# Patient Record
Sex: FEMALE | Race: BLACK OR AFRICAN AMERICAN | Employment: OTHER | URBAN - METROPOLITAN AREA
[De-identification: names, ages, dates, MRNs, and addresses within clinical notes are randomized per-mention and may not be internally consistent; named-entity substitution may affect disease eponyms.]

---

## 2019-01-09 DIAGNOSIS — M25.561 RIGHT KNEE PAIN, UNSPECIFIED CHRONICITY: Primary | ICD-10-CM

## 2019-01-09 DIAGNOSIS — M25.552 HIP PAIN, LEFT: ICD-10-CM

## 2019-01-10 ENCOUNTER — OFFICE VISIT (OUTPATIENT)
Dept: ORTHOPEDIC SURGERY | Age: 79
End: 2019-01-10
Payer: MEDICARE

## 2019-01-10 VITALS
WEIGHT: 154 LBS | BODY MASS INDEX: 27.29 KG/M2 | DIASTOLIC BLOOD PRESSURE: 84 MMHG | SYSTOLIC BLOOD PRESSURE: 134 MMHG | HEIGHT: 63 IN | HEART RATE: 65 BPM

## 2019-01-10 DIAGNOSIS — M17.11 PRIMARY OSTEOARTHRITIS OF RIGHT KNEE: Primary | ICD-10-CM

## 2019-01-10 DIAGNOSIS — M70.62 GREATER TROCHANTERIC BURSITIS OF LEFT HIP: ICD-10-CM

## 2019-01-10 PROCEDURE — 1101F PT FALLS ASSESS-DOCD LE1/YR: CPT | Performed by: ORTHOPAEDIC SURGERY

## 2019-01-10 PROCEDURE — G8419 CALC BMI OUT NRM PARAM NOF/U: HCPCS | Performed by: ORTHOPAEDIC SURGERY

## 2019-01-10 PROCEDURE — 1090F PRES/ABSN URINE INCON ASSESS: CPT | Performed by: ORTHOPAEDIC SURGERY

## 2019-01-10 PROCEDURE — 1123F ACP DISCUSS/DSCN MKR DOCD: CPT | Performed by: ORTHOPAEDIC SURGERY

## 2019-01-10 PROCEDURE — 1036F TOBACCO NON-USER: CPT | Performed by: ORTHOPAEDIC SURGERY

## 2019-01-10 PROCEDURE — G8427 DOCREV CUR MEDS BY ELIG CLIN: HCPCS | Performed by: ORTHOPAEDIC SURGERY

## 2019-01-10 PROCEDURE — G8484 FLU IMMUNIZE NO ADMIN: HCPCS | Performed by: ORTHOPAEDIC SURGERY

## 2019-01-10 PROCEDURE — 20611 DRAIN/INJ JOINT/BURSA W/US: CPT | Performed by: ORTHOPAEDIC SURGERY

## 2019-01-10 PROCEDURE — G8400 PT W/DXA NO RESULTS DOC: HCPCS | Performed by: ORTHOPAEDIC SURGERY

## 2019-01-10 PROCEDURE — 4040F PNEUMOC VAC/ADMIN/RCVD: CPT | Performed by: ORTHOPAEDIC SURGERY

## 2019-01-10 PROCEDURE — 99214 OFFICE O/P EST MOD 30 MIN: CPT | Performed by: ORTHOPAEDIC SURGERY

## 2019-01-10 RX ORDER — CYCLOBENZAPRINE HCL 10 MG
10 TABLET ORAL
COMMUNITY
Start: 2018-12-28

## 2019-01-10 RX ORDER — GABAPENTIN 300 MG/1
300 CAPSULE ORAL
COMMUNITY
Start: 2019-01-09

## 2019-01-10 RX ORDER — OMEPRAZOLE 20 MG/1
20 CAPSULE, DELAYED RELEASE ORAL DAILY
COMMUNITY

## 2019-01-10 RX ORDER — HYDROCODONE BITARTRATE AND ACETAMINOPHEN 5; 325 MG/1; MG/1
5-325 TABLET ORAL
COMMUNITY
Start: 2018-12-26 | End: 2022-07-12 | Stop reason: SDUPTHER

## 2019-01-12 RX ORDER — METHYLPREDNISOLONE ACETATE 40 MG/ML
40 INJECTION, SUSPENSION INTRA-ARTICULAR; INTRALESIONAL; INTRAMUSCULAR; SOFT TISSUE ONCE
Status: DISCONTINUED | OUTPATIENT
Start: 2019-01-12 | End: 2022-04-20

## 2019-01-12 RX ORDER — LIDOCAINE HYDROCHLORIDE 10 MG/ML
4 INJECTION, SOLUTION INFILTRATION; PERINEURAL ONCE
Status: SHIPPED | OUTPATIENT
Start: 2019-01-12

## 2019-01-12 ASSESSMENT — ENCOUNTER SYMPTOMS
CONSTIPATION: 0
ABDOMINAL DISTENTION: 0
COUGH: 0
APNEA: 0
RESPIRATORY NEGATIVE: 1
NAUSEA: 0
CHEST TIGHTNESS: 0
SHORTNESS OF BREATH: 0
ABDOMINAL PAIN: 0
COLOR CHANGE: 0
VOMITING: 0
DIARRHEA: 0

## 2019-01-18 ENCOUNTER — TELEPHONE (OUTPATIENT)
Dept: ORTHOPEDIC SURGERY | Age: 79
End: 2019-01-18

## 2019-07-01 ENCOUNTER — OFFICE VISIT (OUTPATIENT)
Dept: ORTHOPEDIC SURGERY | Age: 79
End: 2019-07-01
Payer: MEDICARE

## 2019-07-01 VITALS
HEART RATE: 65 BPM | DIASTOLIC BLOOD PRESSURE: 73 MMHG | SYSTOLIC BLOOD PRESSURE: 114 MMHG | HEIGHT: 64 IN | WEIGHT: 156 LBS | BODY MASS INDEX: 26.63 KG/M2

## 2019-07-01 DIAGNOSIS — S70.02XA CONTUSION OF LEFT HIP, INITIAL ENCOUNTER: Primary | ICD-10-CM
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
## 2019-07-01 PROCEDURE — 99213 OFFICE O/P EST LOW 20 MIN: CPT | Performed by: PHYSICIAN ASSISTANT

## 2019-07-01 RX ORDER — AMMONIUM LACTATE 12 G/100G
12 CREAM TOPICAL DAILY
COMMUNITY
Start: 2018-05-15

## 2019-07-01 RX ORDER — CYCLOSPORINE 0.5 MG/ML
1 EMULSION OPHTHALMIC DAILY
COMMUNITY
Start: 2018-02-02

## 2019-07-01 RX ORDER — ATORVASTATIN CALCIUM 10 MG/1
10 TABLET, FILM COATED ORAL DAILY
COMMUNITY
Start: 2017-09-22

## 2019-07-01 ASSESSMENT — ENCOUNTER SYMPTOMS
SHORTNESS OF BREATH: 0
ABDOMINAL DISTENTION: 0
COUGH: 0
APNEA: 0
VOMITING: 0
ABDOMINAL PAIN: 0
CONSTIPATION: 0
NAUSEA: 0
CHEST TIGHTNESS: 0
COLOR CHANGE: 0
DIARRHEA: 0

## 2019-07-01 NOTE — PROGRESS NOTES
have reviewed their documentation prior to providing my signature indicating agreement. Vitals:   /73   Pulse 65   Ht 5' 3.75\" (1.619 m)   Wt 156 lb (70.8 kg)   BMI 26.99 kg/m²  Body mass index is 26.99 kg/m². Physical Examination:     Orthopedics:    GENERAL: Alert and oriented X3 in no acute distress. SKIN: Intact without lesions or ulcerations. NEURO: Musculoskeletal and axillary nerves intact to sensory and motor testing. VASC: Capillary refill is less than 3 seconds. Post Op Exam:    LOCATION: Left Hip  SITE: Distal neurocirculatory status is intact. EXAM: Sensation is intact to light touch, there is full motor function of the extremity. PALP: Incision is well healed with no signs of infection. Ecchymosis noted over the lateral aspect of the hip  ROM: 90 degrees of flexion    Procedures:     Procedure:  No    Radiology:   See separate report. Assessment:     1. Contusion of left hip, initial encounter      Plan:   Post Op Treatment : Patient had the treatment regimen reviewed today 3 years and 11 months s/p Left GOPAL. I reviewed the films with the patient. We discussed some of the etiologies and natural histories of s/p left GOPAL. We also discussed the various treatment alternatives including anti-inflammatory medications, physical therapy, injections, further imaging studies and as a last resort surgery. During today's visit, I explained to the patient that she needs to be careful when she is moving her furniture at home because her strength is not all there and she needs to regain her strength in her legs so she may improve her balance. I also told her that she should call us if the pain does not go away over the next two weeks but she should take the NSAID's at a prescription dose to help reduce the inflammation and pain she has over her hip. Patient should return to the office as needed. The patient will call the office immediately with any problems that may arise.      No orders of the defined types were placed in this encounter. No orders of the defined types were placed in this encounter. Sil Christensen V, am scribing for and in the presence of  Buck Lira ATC. 7/6/2019  8:59 PM      I, Ebony Lombardo PA-C, BELKIS,  have personally seen this patient, reviewed the chart including history, and imaging if done. I personally  performed the physical exam and obtained any needed additional history. I placed orders, performed or supervised procedures and developed the treatment plan.     Electronically signed by Danni Culp PA-C, on 7/6/2019 at 8:59 PM      Electronically signed by Danni Culp PA-C, on 7/6/2019 at 8:59 PM

## 2019-10-02 ENCOUNTER — OFFICE VISIT (OUTPATIENT)
Dept: ORTHOPEDIC SURGERY | Age: 79
End: 2019-10-02
Payer: MEDICARE

## 2019-10-02 VITALS
WEIGHT: 155 LBS | BODY MASS INDEX: 26.46 KG/M2 | SYSTOLIC BLOOD PRESSURE: 133 MMHG | HEIGHT: 64 IN | DIASTOLIC BLOOD PRESSURE: 66 MMHG | HEART RATE: 65 BPM

## 2019-10-02 DIAGNOSIS — M51.36 DDD (DEGENERATIVE DISC DISEASE), LUMBAR: ICD-10-CM

## 2019-10-02 DIAGNOSIS — M54.32 SCIATICA OF LEFT SIDE: Primary | ICD-10-CM

## 2019-10-02 PROCEDURE — G8484 FLU IMMUNIZE NO ADMIN: HCPCS | Performed by: PHYSICIAN ASSISTANT

## 2019-10-02 PROCEDURE — G8419 CALC BMI OUT NRM PARAM NOF/U: HCPCS | Performed by: PHYSICIAN ASSISTANT

## 2019-10-02 PROCEDURE — 4040F PNEUMOC VAC/ADMIN/RCVD: CPT | Performed by: PHYSICIAN ASSISTANT

## 2019-10-02 PROCEDURE — 1036F TOBACCO NON-USER: CPT | Performed by: PHYSICIAN ASSISTANT

## 2019-10-02 PROCEDURE — G8400 PT W/DXA NO RESULTS DOC: HCPCS | Performed by: PHYSICIAN ASSISTANT

## 2019-10-02 PROCEDURE — 99213 OFFICE O/P EST LOW 20 MIN: CPT | Performed by: PHYSICIAN ASSISTANT

## 2019-10-02 PROCEDURE — 1090F PRES/ABSN URINE INCON ASSESS: CPT | Performed by: PHYSICIAN ASSISTANT

## 2019-10-02 PROCEDURE — 1123F ACP DISCUSS/DSCN MKR DOCD: CPT | Performed by: PHYSICIAN ASSISTANT

## 2019-10-02 PROCEDURE — G8427 DOCREV CUR MEDS BY ELIG CLIN: HCPCS | Performed by: PHYSICIAN ASSISTANT

## 2019-10-02 RX ORDER — METHYLPREDNISOLONE 4 MG/1
TABLET ORAL
Qty: 1 KIT | Refills: 0 | Status: SHIPPED | OUTPATIENT
Start: 2019-10-02 | End: 2022-04-20

## 2019-10-02 RX ORDER — TIZANIDINE 2 MG/1
2 TABLET ORAL EVERY 8 HOURS PRN
Qty: 30 TABLET | Refills: 0 | Status: SHIPPED | OUTPATIENT
Start: 2019-10-02 | End: 2019-10-12

## 2019-10-02 ASSESSMENT — ENCOUNTER SYMPTOMS
ABDOMINAL PAIN: 0
DIARRHEA: 0
ABDOMINAL DISTENTION: 0
SHORTNESS OF BREATH: 0
COUGH: 0
APNEA: 0
CHEST TIGHTNESS: 0
CONSTIPATION: 0
VOMITING: 0
NAUSEA: 0
COLOR CHANGE: 0

## 2019-11-25 ENCOUNTER — OFFICE VISIT (OUTPATIENT)
Dept: ORTHOPEDIC SURGERY | Age: 79
End: 2019-11-25
Payer: MEDICARE

## 2019-11-25 VITALS
BODY MASS INDEX: 26.58 KG/M2 | SYSTOLIC BLOOD PRESSURE: 138 MMHG | HEART RATE: 63 BPM | HEIGHT: 63 IN | DIASTOLIC BLOOD PRESSURE: 73 MMHG | WEIGHT: 150 LBS

## 2019-11-25 DIAGNOSIS — M51.36 DDD (DEGENERATIVE DISC DISEASE), LUMBAR: Primary | ICD-10-CM

## 2019-11-25 DIAGNOSIS — R52 PAIN: ICD-10-CM

## 2019-11-25 DIAGNOSIS — M17.12 PRIMARY OSTEOARTHRITIS OF LEFT KNEE: ICD-10-CM

## 2019-11-25 PROCEDURE — 1036F TOBACCO NON-USER: CPT | Performed by: ORTHOPAEDIC SURGERY

## 2019-11-25 PROCEDURE — G8400 PT W/DXA NO RESULTS DOC: HCPCS | Performed by: ORTHOPAEDIC SURGERY

## 2019-11-25 PROCEDURE — 1123F ACP DISCUSS/DSCN MKR DOCD: CPT | Performed by: ORTHOPAEDIC SURGERY

## 2019-11-25 PROCEDURE — G8427 DOCREV CUR MEDS BY ELIG CLIN: HCPCS | Performed by: ORTHOPAEDIC SURGERY

## 2019-11-25 PROCEDURE — 1090F PRES/ABSN URINE INCON ASSESS: CPT | Performed by: ORTHOPAEDIC SURGERY

## 2019-11-25 PROCEDURE — G8417 CALC BMI ABV UP PARAM F/U: HCPCS | Performed by: ORTHOPAEDIC SURGERY

## 2019-11-25 PROCEDURE — 20611 DRAIN/INJ JOINT/BURSA W/US: CPT | Performed by: ORTHOPAEDIC SURGERY

## 2019-11-25 PROCEDURE — 99214 OFFICE O/P EST MOD 30 MIN: CPT | Performed by: ORTHOPAEDIC SURGERY

## 2019-11-25 PROCEDURE — G8484 FLU IMMUNIZE NO ADMIN: HCPCS | Performed by: ORTHOPAEDIC SURGERY

## 2019-11-25 PROCEDURE — 4040F PNEUMOC VAC/ADMIN/RCVD: CPT | Performed by: ORTHOPAEDIC SURGERY

## 2019-11-25 RX ORDER — LIDOCAINE HYDROCHLORIDE 10 MG/ML
4 INJECTION, SOLUTION INFILTRATION; PERINEURAL ONCE
Status: COMPLETED | OUTPATIENT
Start: 2019-11-25 | End: 2019-11-25

## 2019-11-25 RX ORDER — METHYLPREDNISOLONE ACETATE 40 MG/ML
40 INJECTION, SUSPENSION INTRA-ARTICULAR; INTRALESIONAL; INTRAMUSCULAR; SOFT TISSUE ONCE
Status: COMPLETED | OUTPATIENT
Start: 2019-11-25 | End: 2019-11-25

## 2019-11-25 RX ADMIN — LIDOCAINE HYDROCHLORIDE 4 ML: 10 INJECTION, SOLUTION INFILTRATION; PERINEURAL at 17:25

## 2019-11-25 RX ADMIN — METHYLPREDNISOLONE ACETATE 40 MG: 40 INJECTION, SUSPENSION INTRA-ARTICULAR; INTRALESIONAL; INTRAMUSCULAR; SOFT TISSUE at 17:26

## 2019-11-25 ASSESSMENT — ENCOUNTER SYMPTOMS
SHORTNESS OF BREATH: 0
APNEA: 0
ABDOMINAL PAIN: 0
COLOR CHANGE: 0
CHEST TIGHTNESS: 0
CONSTIPATION: 0
ABDOMINAL DISTENTION: 0
VOMITING: 0
COUGH: 0
NAUSEA: 0
DIARRHEA: 0

## 2020-10-14 ENCOUNTER — OFFICE VISIT (OUTPATIENT)
Dept: ORTHOPEDIC SURGERY | Age: 80
End: 2020-10-14
Payer: MEDICARE

## 2020-10-14 VITALS
HEIGHT: 63 IN | SYSTOLIC BLOOD PRESSURE: 140 MMHG | TEMPERATURE: 97 F | WEIGHT: 150 LBS | DIASTOLIC BLOOD PRESSURE: 77 MMHG | BODY MASS INDEX: 26.58 KG/M2 | HEART RATE: 55 BPM

## 2020-10-14 PROCEDURE — 4040F PNEUMOC VAC/ADMIN/RCVD: CPT | Performed by: PHYSICIAN ASSISTANT

## 2020-10-14 PROCEDURE — 1036F TOBACCO NON-USER: CPT | Performed by: PHYSICIAN ASSISTANT

## 2020-10-14 PROCEDURE — G8417 CALC BMI ABV UP PARAM F/U: HCPCS | Performed by: PHYSICIAN ASSISTANT

## 2020-10-14 PROCEDURE — G8400 PT W/DXA NO RESULTS DOC: HCPCS | Performed by: PHYSICIAN ASSISTANT

## 2020-10-14 PROCEDURE — 1090F PRES/ABSN URINE INCON ASSESS: CPT | Performed by: PHYSICIAN ASSISTANT

## 2020-10-14 PROCEDURE — 1123F ACP DISCUSS/DSCN MKR DOCD: CPT | Performed by: PHYSICIAN ASSISTANT

## 2020-10-14 PROCEDURE — G8484 FLU IMMUNIZE NO ADMIN: HCPCS | Performed by: PHYSICIAN ASSISTANT

## 2020-10-14 PROCEDURE — G8427 DOCREV CUR MEDS BY ELIG CLIN: HCPCS | Performed by: PHYSICIAN ASSISTANT

## 2020-10-14 PROCEDURE — 99213 OFFICE O/P EST LOW 20 MIN: CPT | Performed by: PHYSICIAN ASSISTANT

## 2020-10-14 ASSESSMENT — ENCOUNTER SYMPTOMS
COLOR CHANGE: 0
SHORTNESS OF BREATH: 0
ABDOMINAL DISTENTION: 0
CHEST TIGHTNESS: 0
COUGH: 0
ABDOMINAL PAIN: 0
CONSTIPATION: 0
APNEA: 0
VOMITING: 0
NAUSEA: 0
DIARRHEA: 0

## 2020-10-14 NOTE — PROGRESS NOTES
[facility]  [address] [address]  Dept: [phone]  Dept Fax: [phone]        {RIGHT OR LEFT:07049} Hip Office Visit    Subjective:   No chief complaint on file. HPI:     Siomara Georges presents with a *** history of pain in the {Left/right:47177} hip. The pain {DOES/DOES NOT:27143} radiate into the thigh and into the groin. The pain is {Blank multiple:17965::\"not present\",\"present\"} over the lateral aspect of the hip. The pain is most troublesome during ambulatory activity and now limits the patient`s walking distance to ***. Night pain, which disturbs the patient`s sleep {IS/IS NOT:14041} a problem. The patient has had to give up some activities such as ***, which has produced a consequent deterioration in quality of life. she has tried *** and reduction of activity and reports {Desc; no/some/marked:59733} improvement. Back pain is {Blank multiple:97244::\"not present\",\"present\"} but is tolerable and {DOES/DOES NOT:98770} not interfere with the patient`s life as does the hip. The patient {HAS HAS CCC:32633} had a cortisone injection. The patient {HAS HAS PL} tried physical therapy. The patient {HAS HAS JGV:51254} had surgery. The opposite hip {IS/IS AOZ:62973} okay. Knee pain is {MINIMALLY/MODERATELY/MARKEDLY HIP FRACTURE HPI MD:30203} noted. ROS:     Review of Systems    Past Medical History:    Past Medical History:   Diagnosis Date    Acid reflux        Past Surgical History:    Past Surgical History:   Procedure Laterality Date    CATARACT REMOVAL      GALEN    HIP SURGERY      galen GOPAL       CurrentMedications:   Current Outpatient Medications   Medication Sig Dispense Refill    methylPREDNISolone (MEDROL DOSEPACK) 4 MG tablet Take by mouth.  1 kit 0    lipase-protease-amylase (CREON) 66228 units CPEP delayed release capsule Take 180,000 Units by mouth daily      atorvastatin (LIPITOR) 10 MG tablet Take 10 mg by mouth daily      cycloSPORINE (RESTASIS) 0.05 % ophthalmic emulsion Apply 1 drop to eye daily      ammonium lactate (AMLACTIN) 12 % cream Apply 12 each topically daily      Handicap Placard MISC 5 year handicap placard 1 each 0    cyclobenzaprine (FLEXERIL) 10 MG tablet 10 mg      gabapentin (NEURONTIN) 300 MG capsule 300 mg..      HYDROcodone-acetaminophen (NORCO) 5-325 MG per tablet 5-325 tablets. Woody Hicks omeprazole (PRILOSEC) 20 MG delayed release capsule Take 20 mg by mouth daily       Current Facility-Administered Medications   Medication Dose Route Frequency Provider Last Rate Last Dose    lidocaine 1 % injection 4 mL  4 mL Intra-articular Once Susen Pattee, DO        methylPREDNISolone acetate (DEPO-MEDROL) injection 40 mg  40 mg Intra-articular Once Susen Pattee, DO        lidocaine 1 % injection 4 mL  4 mL Intra-articular Once Susen Pattee, DO        methylPREDNISolone acetate (DEPO-MEDROL) injection 40 mg  40 mg Intra-articular Once Susen Pattee, DO           Allergies:    Patient has no known allergies.     Social History:   Social History     Socioeconomic History    Marital status:      Spouse name: Not on file    Number of children: Not on file    Years of education: Not on file    Highest education level: Not on file   Occupational History    Not on file   Social Needs    Financial resource strain: Not on file    Food insecurity     Worry: Not on file     Inability: Not on file   [employer] needs     Medical: Not on file     Non-medical: Not on file   Tobacco Use    Smoking status: Never Smoker    Smokeless tobacco: Never Used   Substance and Sexual Activity    Alcohol use: No    Drug use: No    Sexual activity: Not on file   Lifestyle    Physical activity     Days per week: Not on file     Minutes per session: Not on file    Stress: Not on file   Relationships    Social connections     Talks on phone: Not on file     Gets together: Not on file Attends Yarsanism service: Not on file     Active member of club or organization: Not on file     Attends meetings of clubs or organizations: Not on file     Relationship status: Not on file    Intimate partner violence     Fear of current or ex partner: Not on file     Emotionally abused: Not on file     Physically abused: Not on file     Forced sexual activity: Not on file   Other Topics Concern    Not on file   Social History Narrative    Not on file       Family History:  No family history on file. Vitals: There were no vitals taken for this visit. There is no height or weight on file to calculate BMI. Physical Examination:     Orthopedics:    GENERAL: Alert and oriented X3 in no acute distress. SKIN: Intact without lesions or ulcerations. NEURO: Intact to sensory and motor testing. VASC: Capillary refill is less than 3 seconds. {RIGHT OR LEFT:20818} Hip     GEN: Alert and oriented X 3, in no acute distress. GAIT: The patient's gait was observed while entering the exam room and was noted to be *** antalgic. The extremity is in {Blank single:95525::\"anatomic\",\"varus\",\"valgus\"} alignment. SKIN: Intact without rashes, lesions, or ulcerations. No obvious deformity or swelling. NEURO: The patient responds to light touch throughout {RIGHT LEFT:96525} LE. Patellar and Achilles reflexes are 2/4. VASC: The {RIGHT LEFT:40271} LE is neurovascularly intact with 2/4 DP and ***/4 PT pulses. Brisk capillary refill. ROM: ***degree flexion contracture, *** degrees flexion, *** degrees abduction, *** degrees adduction, *** degrees of internal rotation, *** degrees of external rotation. MUSC: Strength is {Numbers; 1-5:22390}/5 flexion, abduction, internal rotation, external rotation. PALP: The patient {IS/IS NOT:19932} tender to palpation over the greater trochanter. TEST: Log roll is ***. ***No apparent leg length discrepancy.  *** Stenchfield test. *** Impingement test. ***Negative Trendelenburg test.

## 2020-10-14 NOTE — PROGRESS NOTES
[facility]  [address] [place] [id number]  Dept: [phone]  Dept Fax: [phone]        Post Operative Follow Up    Subjective:     Chief Complaint   Patient presents with    Hip Pain     bilateral hip pain     Post Op Surgery:     The patient is here for a follow up after having a left GOPAL and right GOPAL. The date of surgery was on 08/05/2015 for the left hip and in 2011 for the right hip. Therefore we are 5 years and 10 weeks post op on the left hip and 9 years post op for the right hip. Currently the patient's pain is controlled with nothing. Physical therapy was completed. Patient presents today with no ambulatory devices. Patient states that she is doing very well and she is here today for her yearly follow up. Review of Systems   Constitutional: Positive for activity change. Negative for appetite change, fatigue and fever. Respiratory: Negative for apnea, cough, chest tightness and shortness of breath. Cardiovascular: Negative for chest pain, palpitations and leg swelling. Gastrointestinal: Negative for abdominal distention, abdominal pain, constipation, diarrhea, nausea and vomiting. Genitourinary: Negative for difficulty urinating, dysuria and hematuria. Musculoskeletal: Positive for arthralgias. Negative for gait problem, joint swelling and myalgias. Skin: Negative for color change and rash. Neurological: Negative for dizziness, weakness, numbness and headaches. Psychiatric/Behavioral: Negative for sleep disturbance. I have reviewed the CC, HPI, ROS, PMH, FHX, Social History, and if not present in this note, I have reviewed in the patient's chart. I agree with the documentation provided by other staff and have reviewed their documentation prior to providing my signature indicating agreement.   Vitals:   BP (!) 140/77   Pulse 55   Temp 97 °F (36.1 °C)   Ht 5' 3\" (1.6 m)   Wt 150 lb (68 kg) GURPREET. 10/16/2020  12:52 PM      I, Ritesh Cheek PA-C, have personally seen this patient, reviewed the chart including history, and imaging if done. I personally  performed the physical exam and obtained any needed additional history. I placed orders, performed or supervised procedures and developed the treatment plan.     Electronically signed by Bryan Alvarez PA-C, on 10/16/2020 at 12:52 PM      Electronically signed by Bryan Alvarez PA-C, on 10/16/2020 at 12:52 PM

## 2021-11-16 DIAGNOSIS — M16.11 PRIMARY OSTEOARTHRITIS OF RIGHT HIP: ICD-10-CM

## 2021-11-16 DIAGNOSIS — Z96.643 STATUS POST TOTAL REPLACEMENT OF BOTH HIPS: ICD-10-CM

## 2021-11-16 DIAGNOSIS — M16.12 PRIMARY OSTEOARTHRITIS OF LEFT HIP: Primary | ICD-10-CM

## 2021-11-17 ENCOUNTER — OFFICE VISIT (OUTPATIENT)
Dept: ORTHOPEDIC SURGERY | Age: 81
End: 2021-11-17
Payer: MEDICARE

## 2021-11-17 VITALS
DIASTOLIC BLOOD PRESSURE: 67 MMHG | BODY MASS INDEX: 26.93 KG/M2 | HEIGHT: 63 IN | SYSTOLIC BLOOD PRESSURE: 115 MMHG | WEIGHT: 152 LBS | RESPIRATION RATE: 14 BRPM | TEMPERATURE: 98.2 F | HEART RATE: 69 BPM

## 2021-11-17 DIAGNOSIS — Z96.643 HISTORY OF TOTAL HIP REPLACEMENT, BILATERAL: Primary | ICD-10-CM

## 2021-11-17 PROCEDURE — 1123F ACP DISCUSS/DSCN MKR DOCD: CPT | Performed by: PHYSICIAN ASSISTANT

## 2021-11-17 PROCEDURE — G8484 FLU IMMUNIZE NO ADMIN: HCPCS | Performed by: PHYSICIAN ASSISTANT

## 2021-11-17 PROCEDURE — 1090F PRES/ABSN URINE INCON ASSESS: CPT | Performed by: PHYSICIAN ASSISTANT

## 2021-11-17 PROCEDURE — G8400 PT W/DXA NO RESULTS DOC: HCPCS | Performed by: PHYSICIAN ASSISTANT

## 2021-11-17 PROCEDURE — G8427 DOCREV CUR MEDS BY ELIG CLIN: HCPCS | Performed by: PHYSICIAN ASSISTANT

## 2021-11-17 PROCEDURE — 1036F TOBACCO NON-USER: CPT | Performed by: PHYSICIAN ASSISTANT

## 2021-11-17 PROCEDURE — 4040F PNEUMOC VAC/ADMIN/RCVD: CPT | Performed by: PHYSICIAN ASSISTANT

## 2021-11-17 PROCEDURE — G8417 CALC BMI ABV UP PARAM F/U: HCPCS | Performed by: PHYSICIAN ASSISTANT

## 2021-11-17 PROCEDURE — 99213 OFFICE O/P EST LOW 20 MIN: CPT | Performed by: PHYSICIAN ASSISTANT

## 2021-11-17 ASSESSMENT — ENCOUNTER SYMPTOMS
RESPIRATORY NEGATIVE: 1
CHEST TIGHTNESS: 0
CONSTIPATION: 0
ABDOMINAL DISTENTION: 0
SHORTNESS OF BREATH: 0
COUGH: 0
DIARRHEA: 0
NAUSEA: 0
APNEA: 0
COLOR CHANGE: 0
VOMITING: 0
ABDOMINAL PAIN: 0

## 2021-11-17 NOTE — PROGRESS NOTES
[facility] [facility]  [address] [place] [id number]  Dept: [phone]  Dept Fax: [phone]        Bilateral Hip Office Visit    Subjective:     Chief Complaint   Patient presents with    Follow-up     B Hips s/p GOPAL in 2011 and 2015      HPI:     Blaire De La Torre presents with a history of bilateral total hip replacements. The right was replaced in 2011 and the left was replaced in 2015 by Dr. Shania Gore. She is extremely happy with her hip replacements and has no complaints. She is able to do everything that she wants to do. She is even bowling on a regular basis. She does not need anything for pain. She had been doing antibiotic protocol for dental procedures until her last appointment. ROS:     Review of Systems   Constitutional: Positive for activity change. Negative for appetite change, fatigue and fever. Respiratory: Negative. Negative for apnea, cough, chest tightness and shortness of breath. Cardiovascular: Negative. Negative for chest pain, palpitations and leg swelling. Gastrointestinal: Negative for abdominal distention, abdominal pain, constipation, diarrhea, nausea and vomiting. Genitourinary: Negative for difficulty urinating, dysuria and hematuria. Musculoskeletal: Negative for arthralgias, gait problem, joint swelling and myalgias. Skin: Negative for color change and rash. Neurological: Negative for dizziness, weakness, numbness and headaches. Psychiatric/Behavioral: Negative for sleep disturbance. Past Medical History:    Past Medical History:   Diagnosis Date    Acid reflux        Past Surgical History:    Past Surgical History:   Procedure Laterality Date    CATARACT REMOVAL      GALEN    HIP SURGERY      galen GOPAL       CurrentMedications:   Current Outpatient Medications   Medication Sig Dispense Refill    methylPREDNISolone (MEDROL DOSEPACK) 4 MG tablet Take by mouth.  1 kit 0    lipase-protease-amylase (CREON) 29008 units CPEP delayed release capsule Take 180,000 Units by mouth daily      atorvastatin (LIPITOR) 10 MG tablet Take 10 mg by mouth daily      cycloSPORINE (RESTASIS) 0.05 % ophthalmic emulsion Apply 1 drop to eye daily      ammonium lactate (AMLACTIN) 12 % cream Apply 12 each topically daily      Handicap Placard MISC 5 year handicap placard 1 each 0    cyclobenzaprine (FLEXERIL) 10 MG tablet 10 mg      gabapentin (NEURONTIN) 300 MG capsule 300 mg..      HYDROcodone-acetaminophen (NORCO) 5-325 MG per tablet 5-325 tablets. Felisa Patricia omeprazole (PRILOSEC) 20 MG delayed release capsule Take 20 mg by mouth daily       Current Facility-Administered Medications   Medication Dose Route Frequency Provider Last Rate Last Admin    lidocaine 1 % injection 4 mL  4 mL Intra-artICUlar Once Pancho Car, DO        methylPREDNISolone acetate (DEPO-MEDROL) injection 40 mg  40 mg Intra-artICUlar Once Pancho Car, DO        lidocaine 1 % injection 4 mL  4 mL Intra-artICUlar Once Pancho Car, DO        methylPREDNISolone acetate (DEPO-MEDROL) injection 40 mg  40 mg Intra-artICUlar Once Pancho Car, DO           Allergies:    Patient has no known allergies.     Social History:   Social History     Socioeconomic History    Marital status:      Spouse name: None    Number of children: None    Years of education: None    Highest education level: None   Occupational History    None   Tobacco Use    Smoking status: Never Smoker    Smokeless tobacco: Never Used   Substance and Sexual Activity    Alcohol use: No    Drug use: No    Sexual activity: None   Other Topics Concern    None   Social History Narrative    None     Social Determinants of Health     Financial Resource Strain:     Difficulty of Paying Living Expenses: Not on file   Food Insecurity:     Worried About Running Out of Food in the Last Year: Not on file    Gisele of Food in the Last Year: Not on file Transportation Needs:     Lack of Transportation (Medical): Not on file    Lack of Transportation (Non-Medical): Not on file   Physical Activity:     Days of Exercise per Week: Not on file    Minutes of Exercise per Session: Not on file   Stress:     Feeling of Stress : Not on file   Social Connections:     Frequency of Communication with Friends and Family: Not on file    Frequency of Social Gatherings with Friends and Family: Not on file    Attends Scientologist Services: Not on file    Active Member of [address] [place] [address] [employer] or Organizations: Not on file    Attends Club or Organization Meetings: Not on file    Marital Status: Not on file   Intimate Partner Violence:     Fear of Current or Ex-Partner: Not on file    Emotionally Abused: Not on file    Physically Abused: Not on file    Sexually Abused: Not on file   Housing Stability:     Unable to Pay for Housing in the Last Year: Not on file    Number of [place] in the Last Year: Not on file    Unstable Housing in the Last Year: Not on file       Family History:  No family history on file. Vitals:   /67   Pulse 69   Temp 98.2 °F (36.8 °C)   Resp 14   Ht 5' 3\" (1.6 m)   Wt 152 lb (68.9 kg)   BMI 26.93 kg/m²  Body mass index is 26.93 kg/m². Physical Examination:     Orthopedics:    GENERAL: Alert and oriented X3 in no acute distress. SKIN: Intact without lesions or ulcerations. NEURO: Intact to sensory and motor testing. VASC: Capillary refill is less than 3 seconds. Bilateral Hip     GEN: Alert and oriented X 3, in no acute distress. GAIT: The patient's gait was observed while entering the exam room and was noted to be non antalgic. The extremity is in anatomic alignment. SKIN: Intact without rashes, lesions, or ulcerations. No obvious deformity or swelling. NEURO: The patient responds to light touch throughout bilateral LE. Patellar and Achilles reflexes are 2/4.   VASC: The bilateral LE is neurovascularly intact with 2/4 DP and 2/4 PT pulses. Brisk capillary refill. ROM: 0 degree flexion contracture, 100 degrees flexion, 30 degrees abduction, 30 degrees adduction, 30 degrees of internal rotation, 45 degrees of external rotation. MUSC: Strength is 5/5 flexion, abduction, internal rotation, external rotation. PALP: The patient is  tender to palpation over the greater trochanter. TEST: Log roll is negative. No apparent leg length discrepancy. Negative Stenchfield test.  Negative impingement test. Negative Trendelenburg test. Negative Samuel's test.  Negative C sign. No labral clunks. No pain on compression. Assessment:     1. History of total hip replacement, bilateral      Procedures:    Procedure: no  Radiology:   XR HIP LEFT (2-3 VIEWS)    Result Date: 11/17/2021  HIP X-RAY  AP and standing AP of the pelvis and supine AP and frog leg lateral of the bilateral hip reveals a total hips arthroplasty in proper position with no subsidence, loosening (DeLee-Charnley zones 1-3 of the acetabulum or Gruen zones 1-7 of the femoral component) or osteolysis present. There is no acute bony abnormalities including soft tissue masses, periosteal reaction or lytic bony lesions. 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 No significant change in his previous x-rays October 14, 2020  Impression: Bilateral total hip arthroplasty with no complicating features. XR HIP RIGHT MIN 4VW W PELVIS    Result Date: 11/17/2021  HIP X-RAY  AP and standing AP of the pelvis and supine AP and frog leg lateral of the bilateral hip reveals a total hips arthroplasty in proper position with no subsidence, loosening (DeLee-Charnley zones 1-3 of the acetabulum or Gruen zones 1-7 of the femoral component) or osteolysis present. There is no acute bony abnormalities including soft tissue masses, periosteal reaction or lytic bony lesions. 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   No significant change in his previous x-rays October 14, 2020  Impression: Bilateral total hip arthroplasty with no complicating features. Plan:   Treatment : I reviewed the X-ray with the patient and I informed them that the x-rays look good with no changes since her previous x-rays in October 2020. We discussed the etiologies and natural histories of bilateral total hip arthroplasties. We discussed the various treatment alternatives including anti-inflammatory medications, physical therapy, injections, further imaging studies and as a last result surgery. During today's visit, we discussed that she is extremely happy with her hip replacements. We did discuss antibiotic dental prophylaxis and there is some controversy on whether it is needed or not. Dr. Natali Klein is him working with his now and he does say that it should be done for a lifetime. If she does not have issues with the antibiotics I would suggest that she continue using them because getting a septic joint is devastating. The patient has opted for following up in 1 or 2 years with preclinic x-rays. Patient should return to the clinic in 1 years to follow up with  Kenny Washburn PA-C. The patient will call the office immediately with any problems. No orders of the defined types were placed in this encounter. No orders of the defined types were placed in this encounter. This note is created with the assistance of a speech recognition program.  While intending to generate a document that actually reflects the content of the visit, the document can still have some errors including those of syntax and sound a like substitutions which may escape proof reading.   In such instances, actual meaning can be extrapolated by contextual diversion      Electronically signed by Seda Stone PA-C, on 11/17/2021 at 4:28 PM

## 2022-04-20 ENCOUNTER — OFFICE VISIT (OUTPATIENT)
Dept: ORTHOPEDIC SURGERY | Age: 82
End: 2022-04-20
Payer: MEDICARE

## 2022-04-20 VITALS
HEIGHT: 63 IN | SYSTOLIC BLOOD PRESSURE: 132 MMHG | DIASTOLIC BLOOD PRESSURE: 74 MMHG | WEIGHT: 149 LBS | RESPIRATION RATE: 12 BRPM | HEART RATE: 59 BPM | BODY MASS INDEX: 26.4 KG/M2

## 2022-04-20 DIAGNOSIS — M70.62 GREATER TROCHANTERIC BURSITIS OF LEFT HIP: ICD-10-CM

## 2022-04-20 DIAGNOSIS — Z96.643 HISTORY OF TOTAL HIP REPLACEMENT, BILATERAL: Primary | ICD-10-CM

## 2022-04-20 DIAGNOSIS — M51.36 DDD (DEGENERATIVE DISC DISEASE), LUMBAR: ICD-10-CM

## 2022-04-20 PROCEDURE — 4040F PNEUMOC VAC/ADMIN/RCVD: CPT | Performed by: PHYSICIAN ASSISTANT

## 2022-04-20 PROCEDURE — 99214 OFFICE O/P EST MOD 30 MIN: CPT | Performed by: PHYSICIAN ASSISTANT

## 2022-04-20 PROCEDURE — G8400 PT W/DXA NO RESULTS DOC: HCPCS | Performed by: PHYSICIAN ASSISTANT

## 2022-04-20 PROCEDURE — 1036F TOBACCO NON-USER: CPT | Performed by: PHYSICIAN ASSISTANT

## 2022-04-20 PROCEDURE — 1090F PRES/ABSN URINE INCON ASSESS: CPT | Performed by: PHYSICIAN ASSISTANT

## 2022-04-20 PROCEDURE — 1123F ACP DISCUSS/DSCN MKR DOCD: CPT | Performed by: PHYSICIAN ASSISTANT

## 2022-04-20 PROCEDURE — G8427 DOCREV CUR MEDS BY ELIG CLIN: HCPCS | Performed by: PHYSICIAN ASSISTANT

## 2022-04-20 PROCEDURE — G8417 CALC BMI ABV UP PARAM F/U: HCPCS | Performed by: PHYSICIAN ASSISTANT

## 2022-04-20 RX ORDER — TIZANIDINE 4 MG/1
4 TABLET ORAL EVERY 8 HOURS PRN
Qty: 30 TABLET | Refills: 0 | Status: SHIPPED | OUTPATIENT
Start: 2022-04-20 | End: 2022-04-30

## 2022-04-20 RX ORDER — METHYLPREDNISOLONE 4 MG/1
TABLET ORAL
Qty: 1 KIT | Refills: 0 | Status: SHIPPED | OUTPATIENT
Start: 2022-04-20 | End: 2022-08-17 | Stop reason: ALTCHOICE

## 2022-04-20 ASSESSMENT — ENCOUNTER SYMPTOMS
COLOR CHANGE: 0
CHEST TIGHTNESS: 0
APNEA: 0
COUGH: 0
ABDOMINAL PAIN: 0
ABDOMINAL DISTENTION: 0
NAUSEA: 0
DIARRHEA: 0
VOMITING: 0
CONSTIPATION: 0
RESPIRATORY NEGATIVE: 1
SHORTNESS OF BREATH: 0

## 2022-04-20 NOTE — PATIENT INSTRUCTIONS
Patient Education        Low Back Pain: Exercises  Introduction  Here are some examples of exercises for you to try. The exercises may be suggested for a condition or for rehabilitation. Start each exercise slowly. Ease off the exercises if you start to have pain. You will be told when to start these exercises and which ones will work bestfor you. How to do the exercises  Press-up    1. Lie on your stomach, supporting your body with your forearms. 2. Press your elbows down into the floor to raise your upper back. As you do this, relax your stomach muscles and allow your back to arch without using your back muscles. As your press up, do not let your hips or pelvis come off the floor. 3. Hold for 15 to 30 seconds, then relax. 4. Repeat 2 to 4 times. Alternate arm and leg (bird dog) exercise    Do this exercise slowly. Try to keep your body straight at all times, and donot let one hip drop lower than the other. 1. Start on the floor, on your hands and knees. 2. Tighten your belly muscles. 3. Raise one leg off the floor, and hold it straight out behind you. Be careful not to let your hip drop down, because that will twist your trunk. 4. Hold for about 6 seconds, then lower your leg and switch to the other leg. 5. Repeat 8 to 12 times on each leg. 6. Over time, work up to holding for 10 to 30 seconds each time. 7. If you feel stable and secure with your leg raised, try raising the opposite arm straight out in front of you at the same time. Knee-to-chest exercise    1. Lie on your back with your knees bent and your feet flat on the floor. 2. Bring one knee to your chest, keeping the other foot flat on the floor (or keeping the other leg straight, whichever feels better on your lower back). 3. Keep your lower back pressed to the floor. Hold for at least 15 to 30 seconds. 4. Relax, and lower the knee to the starting position. 5. Repeat with the other leg. Repeat 2 to 4 times with each leg.   6. To get more stretch, put your other leg flat on the floor while pulling your knee to your chest.  Curl-ups    1. Lie on the floor on your back with your knees bent at a 90-degree angle. Your feet should be flat on the floor, about 12 inches from your buttocks. 2. Cross your arms over your chest. If this bothers your neck, try putting your hands behind your neck (not your head), with your elbows spread apart. 3. Slowly tighten your belly muscles and raise your shoulder blades off the floor. 4. Keep your head in line with your body, and do not press your chin to your chest.  5. Hold this position for 1 or 2 seconds, then slowly lower yourself back down to the floor. 6. Repeat 8 to 12 times. Pelvic tilt exercise    1. Lie on your back with your knees bent. 2. \"Brace\" your stomach. This means to tighten your muscles by pulling in and imagining your belly button moving toward your spine. You should feel like your back is pressing to the floor and your hips and pelvis are rocking back. 3. Hold for about 6 seconds while you breathe smoothly. 4. Repeat 8 to 12 times. Heel dig bridging    1. Lie on your back with both knees bent and your ankles bent so that only your heels are digging into the floor. Your knees should be bent about 90 degrees. 2. Then push your heels into the floor, squeeze your buttocks, and lift your hips off the floor until your shoulders, hips, and knees are all in a straight line. 3. Hold for about 6 seconds as you continue to breathe normally, and then slowly lower your hips back down to the floor and rest for up to 10 seconds. 4. Do 8 to 12 repetitions. Hamstring stretch in doorway    1. Lie on your back in a doorway, with one leg through the open door. 2. Slide your leg up the wall to straighten your knee. You should feel a gentle stretch down the back of your leg. 3. Hold the stretch for at least 15 to 30 seconds. Do not arch your back, point your toes, or bend either knee.  Keep one heel touching the floor and the other heel touching the wall. 4. Repeat with your other leg. 5. Do 2 to 4 times for each leg. Hip flexor stretch    1. Kneel on the floor with one knee bent and one leg behind you. Place your forward knee over your foot. Keep your other knee touching the floor. 2. Slowly push your hips forward until you feel a stretch in the upper thigh of your rear leg. 3. Hold the stretch for at least 15 to 30 seconds. Repeat with your other leg. 4. Do 2 to 4 times on each side. Wall sit    1. Stand with your back 10 to 12 inches away from a wall. 2. Lean into the wall until your back is flat against it. 3. Slowly slide down until your knees are slightly bent, pressing your lower back into the wall. 4. Hold for about 6 seconds, then slide back up the wall. 5. Repeat 8 to 12 times. Follow-up care is a key part of your treatment and safety. Be sure to make and go to all appointments, and call your doctor if you are having problems. It's also a good idea to know your test results and keep alist of the medicines you take. Where can you learn more? Go to https://T1 Visions.ProspectWise. org and sign in to your [employer] account. Enter D404 in the [facility] box to learn more about \"Low Back Pain: Exercises. \"     If you do not have an account, please click on the \"Sign Up Now\" link. Current as of: July 1, 2021               Content Version: 13.2  © 2006-2022 Healthwise, Incorporated. Care instructions adapted under license by [facility] ([facility]). If you have questions about a medical condition or this instruction, always ask your healthcare professional. [place] [address] any warranty or liability for your use of this information. Patient Education        Trochanteric Bursitis: Exercises  Introduction  Here are some examples of exercises for you to try. The exercises may be suggested for a condition or for rehabilitation. Start each exercise slowly. Ease off the exercises if you start to have pain. You will be told when to start these exercises and which ones will work bestfor you. How to do the exercises  Hamstring wall stretch    1. Lie on your back in a doorway, with your good leg through the open door. 2. Slide your affected leg up the wall to straighten your knee. You should feel a gentle stretch down the back of your leg. 3. Hold the stretch for at least 1 minute to begin. Then try to lengthen the time you hold the stretch to as long as 6 minutes. 4. Repeat 2 to 4 times. 5. If you do not have a place to do this exercise in a doorway, there is another way to do it:  6. Lie on your back, and bend the knee of your affected leg. 7. Loop a towel under the ball and toes of that foot, and hold the ends of the towel in your hands. 8. Straighten your knee, and slowly pull back on the towel. You should feel a gentle stretch down the back of your leg. 9. Hold the stretch for 15 to 30 seconds. Or even better, hold the stretch for 1 minute if you can. 10. Repeat 2 to 4 times. 1. Do not arch your back. 2. Do not bend either knee. 3. Keep one heel touching the floor and the other heel touching the wall. Do not point your toes. Straight-leg raises to the outside    1. Lie on your side, with your affected leg on top. 2. Tighten the front thigh muscles of your top leg to keep your knee straight. 3. Keep your hip and your leg straight in line with the rest of your body, and keep your knee pointing forward. Do not drop your hip back. 4. Lift your top leg straight up toward the ceiling, about 12 inches off the floor. Hold for about 6 seconds, then slowly lower your leg. 5. Repeat 8 to 12 times. Clamshell    1. Lie on your side, with your affected leg on top and your head propped on a pillow. Keep your feet and knees together and your knees bent. 2. Raise your top knee, but keep your feet together. Do not let your hips roll back.  Your legs should open up like a clamshell. 3. Hold for 6 seconds. 4. Slowly lower your knee back down. Rest for 10 seconds. 5. Repeat 8 to 12 times. Standing quadriceps stretch    1. If you are not steady on your feet, hold on to a chair, counter, or wall. You can also lie on your stomach or your side to do this exercise. 2. Bend the knee of the leg you want to stretch, and reach behind you to grab the front of your foot or ankle with the hand on the same side. For example, if you are stretching your right leg, use your right hand. 3. Keeping your knees next to each other, pull your foot toward your buttock until you feel a gentle stretch across the front of your hip and down the front of your thigh. Your knee should be pointed directly to the ground, and not out to the side. 4. Hold the stretch for 15 to 30 seconds. 5. Repeat 2 to 4 times. Piriformis stretch    1. Lie on your back with your legs straight. 2. Lift your affected leg and bend your knee. With your opposite hand, reach across your body, and then gently pull your knee toward your opposite shoulder. 3. Hold the stretch for 15 to 30 seconds. 4. Repeat 2 to 4 times. Double knee-to-chest    1. Lie on your back with your knees bent and your feet flat on the floor. You can put a small pillow under your head and neck if it is more comfortable. 2. Bring both knees to your chest.  3. Keep your lower back pressed to the floor. Hold for 15 to 30 seconds. 4. Relax, and lower your knees to the starting position. 5. Repeat 2 to 4 times. Follow-up care is a key part of your treatment and safety. Be sure to make and go to all appointments, and call your doctor if you are having problems. It's also a good idea to know your test results and keep alist of the medicines you take. Where can you learn more? Go to https://AltaSenselaneb.Biom'Up. org and sign in to your [employer] account.  Enter A354 in the [facility] box to learn more about \"Trochanteric Bursitis: Exercises. \"     If you do not have an account, please click on the \"Sign Up Now\" link. Current as of: July 1, 2021               Content Version: 13.2  © 4630-6793 Healthwise, Incorporated. Care instructions adapted under license by [facility] [place] ([facility]). If you have questions about a medical condition or this instruction, always ask your healthcare professional. [address] any warranty or liability for your use of this information.
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
## 2022-04-29 ENCOUNTER — HOSPITAL ENCOUNTER (OUTPATIENT)
Dept: PHYSICAL THERAPY | Facility: CLINIC | Age: 82
Setting detail: THERAPIES SERIES
Discharge: HOME | End: 2022-04-29
Payer: MEDICARE

## 2022-04-29 PROCEDURE — 97110 THERAPEUTIC EXERCISES: CPT

## 2022-04-29 PROCEDURE — 97161 PT EVAL LOW COMPLEX 20 MIN: CPT
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
## 2022-05-02 NOTE — FLOWSHEET NOTE
[facility] Fall Risk Assessment    Patient Name:  hCapis Sullivan  : 1940    Risk Factor Scale  Score   History of Falls [] Yes  [x] No 25  0 0   Secondary Diagnosis [] Yes  [x] No 15  0 0   Ambulatory Aid [] Furniture  [] Crutches/cane/walker  [x] None/bedrest/wheelchair/nurse 30  15  0 0   IV/Heparin Lock [] Yes  [x] No 20  0 0   Gait/Transferring [] Impaired  [] Weak  [x] Normal/bedrest/immobile 20  10  0 0   Mental Status [] Forgets limitations  [x] Oriented to own ability 15  0 0      Total: 0     Based on the Assessment score: check the appropriate box.     [x]  No intervention needed   Low =   Score of 0-24    []  Use standard prevention interventions Moderate =  Score of 24-44   [] Give patient handout and discuss fall prevention strategies   [] Establish goal of education for patient/family RE: fall prevention strategies    []  Use high risk prevention interventions High = Score of 45 and higher   [] Give patient handout and discuss fall prevention strategies   [] Establish goal of education for patient/family Re: fall prevention strategies   [] Discuss lifeline / other resources    Electronically signed by:   Marquis Shelton PT  Date: 2022

## 2022-05-02 NOTE — PLAN OF CARE
[] [facility] - [facility] [place] [facility] &  Therapy  [address]  [phone]  F: [phone] [x] [address]  [facility] [address]   [address]  P: [phone]  F: [phone] [] [address] &  Therapy  [address]  P: [phone]  F: [phone] [] [address]  P: [phone]  F: [phone] [] [address]  [address] [address] [facility] [address]   [address]   [place]: [phone]  F: [phone]        Physical Therapy Plan of Care    Date:  2022  Patient: Sanchez Han  : 1940  MRN: [id number]  Physician: Joyce Lo PA-C                              Insurance: Medicare/[place] (Medicare Guidelines)  Medical Diagnosis:   Z27.315 (ICD-10-CM) - History of total hip replacement, bilateral   M70.62 (ICD-10-CM) - Greater trochanteric bursitis of left hip   M51.36 (ICD-10-CM) - DDD (degenerative disc disease), lumbar   Rehab Codes: M54.59, M79.652, M62.81  Onset Date: 22               Next 's appt. : 22     Subjective:   CC: low back pain with sitting and lifting; intermittent left hip pain  HPI: (onset date): Pt is an 80year old female with c/o low back pain and hip pain. Pt. Came in with pain at a 0/10 and having a pain of 6/10 at worst. Pt. Said that lifting makes pain worse along with sitting for long periods of time in her recliner at home. Pt. Uses medication to ease pain. Pt. Described pain as numbness in lower back along with hip and knee pain that are stiff in the morning when she wakes up, but the stiffness goes away with movement. Pt. Uses a standard cane in the morning to help with the hip and knee pain. Pt. Had right GOPAL in  and left GOAPL in . Pt.  Has no previous history of obesity, diabetes, heart disease, stroke, allergies, or asthma. Pt. Does have past medical history of acid reflux. Pt. Complains of no fever, chills, malaise, unexpected weight changes, no changes in mental status, no shortness of breath, and no dizziness or lightheadedness Pt. Is retired and participates in a senior citizen [employer] league that she says \"makes her feel like she has ran a marathon\" after each week. Pt. Lives in a one-story home with only 1 step to enter the house, but does not have a hand rail. Pt. Has a tub in her bathroom that she uses the shower rail to help her in and out of. Pt. Does not complain of any sleep disturbances. Pt. Is able to walk when going grocery shopping but always uses the cart to lean on the alleviate hip and knee pain. Pt. Is independent for ADLs, but occasionally uses a cane for walking long distances. Pt. Is going on a cruise in [place] and is looking to walk longer distances without having pain. Pt. Denies any history of falls.      Assessment: Leticia Cavanaugh is an 80 y.o. female with complaints of low back and left hip pain that has been present for sometime. Pt with a hx of B hip replacements and a hx of lateral left hip pain. Pt's PMH is unremarkable and no other red flags are present at this time. Pt's left hip pain is reproduced with FADIR, MMT, and prolonged walking. Pt presents with generalized hip girdle weakness and flexibility deficits. At this time, pt will benefit from skilled therapeutic interventions in order to improve pain, strength, and activity tolerance to maintain current level of independence. Pt will return to the clinic following her 11-day cruise in [place]. Pt has been provided with a HEP to complete while on her trip to assist with pain management and initiate strengthening of her hip girdle. Problems:    [x]? ? Back/left hip Pain: 0-6/10                                      [x]? ? ROM: decreased hip girdle flexibility                    [x]? ? Strength:  Decreased hip girdle strength  [x]? ?  Function: LEFI: 65% max function  []? Postural Deviations  [x]? Gait Deviations: TU\", compensated Trendlenburg gait   []? Other:                       STG: (to be met in 6 treatments)  1. ? Pain: Pt will report a max of 3-4/10 left hip and low back pain when sitting for periods of time, lying, standing in place, and walking in order to improve tolerance to sitting, sleep, and ADL completion. 2. ? Strength: Pt will be able to complete 5 STS from an elevated surface without the use of UE support in order to demonstrate improved LE strength for functional transfers and mobility. 3. ? Function: Pt will be able to complete the TUG in less than or equal to 12\" in order to demonstrate an improvement in safe walking and transitions. 4. Independent with Home Exercise Programs  5. Demonstrate Knowledge of fall prevention- NOT NEEDED  LTG: (to be met in 12 treatments)  1. ? Pain:  Pt will report a max of 2-3/10 left hip and low back pain when sitting for periods of time, lying, standing in place, and walking in order to improve tolerance to sitting, sleep, and ADL completion. 2. ? ROM: Pt will be able to demonstrate AROM in lumbar spine an left hip in all planes with 0/10 left hip or low back pain in order to demonstrate an improvement in sitting and laying tolerance and mobility. 3. ? Strength: Pt will be able to complete at least 5 sit to stands from a standard height chair within 30 seconds without the use of her UEs in order to demonstrate an improvement in LE strength and tolerance to transfers. a. Pt will be able to demonstrate a 4+/5 grossly on hip girdle strength in order to promote pelvic stability and strength when standing and walking. 4. Gait: Pt will be able to complete a 6MWT with reports of mild increase in left hip pain in order to improve tolerance to walking for extended periods of time in the community.   5. ? Function: Pt will improve score on the LEFI to greater than or equal to 80% in order to demonstrate an improvement in funciton.        Patient goals: \"Avoid back surgery\"     Rehab Potential:  [x]? Good  []? Fair  []? Poor    Suggested Professional Referral:  [x]? No  []? Yes:  Barriers to Goal Achievement[de-identified]  []? No  [x]? Yes: PMH   Domestic Concerns:  [x]? No  []? Yes:       Treatment Plan:  [x]? Therapeutic Exercise   05912             []? Iontophoresis: 4 mg/mL Dexamethasone Sodium Phosphate  mAmin  47390   [x]? Therapeutic Activity  41102 []? Vasopneumatic cold with compression  65964               [x]? Gait Training    88808 []? Ultrasound        [id number]   [x]? Neuromuscular Re-education  [id number] []? Electrical Stimulation Unattended  [id number]   [x]? Manual Therapy  70491 []? Electrical Stimulation Attended  [id number]   [x]? Instruction in HEP       []? Lumbar/Cervical Traction  [id number]   []? Aquatic Therapy           [id number] [x]? Cold/hotpack     []? Massage   66975      []? Dry Needling, 1 or 2 muscles  62611   []? Biofeedback, first 15 minutes   81191  []? Biofeedback, additional 15 minutes   23040 []? Dry Needling, 3 or more muscles  07063      []? Medication allergies reviewed for use of               Dexamethasone Sodium Phosphate 4mg/ml                with iontophoresis treatments. Pt is not allergic.     Frequency:  2 x/week for 12 visits    Electronically signed by: Marquis Shelton PT        Physician Signature:________________________________Date:__________________  By signing above or cosigning this note, I have reviewed this plan of care and certify a need for medically necessary rehabilitation services.      *PLEASE SIGN ABOVE AND FAX BACK ALL PAGES*

## 2022-05-12 ENCOUNTER — HOSPITAL ENCOUNTER (OUTPATIENT)
Dept: PHYSICAL THERAPY | Facility: CLINIC | Age: 82
Setting detail: THERAPIES SERIES
Discharge: HOME | End: 2022-05-12
Payer: MEDICARE

## 2022-05-12 PROCEDURE — 97110 THERAPEUTIC EXERCISES: CPT

## 2022-05-12 NOTE — FLOWSHEET NOTE
[] [facility]) - [facility] CENTER &  Therapy  [address]  [phone]  F: [phone] [x] [address]  [facility]   [address]  P: [phone]  F: [phone] [] [address]  [address]  P: [phone]  F: [phone] [] [address]  P: [phone]  F: [phone] [] [address]  [facility]   [address]   [place]: [phone]  F: [phone]      Physical Therapy Daily Treatment Note    Date:  2022  Patient Name:  Jayla Hamilton    :  1940  MRN: [id number]  Physician: Nelson Russell PA-C                              Insurance: Medicare/[place] (Medicare Guidelines)  Medical Diagnosis:   W92.455 (ICD-10-CM) - History of total hip replacement, bilateral   M70.62 (ICD-10-CM) - Greater trochanteric bursitis of left hip   M51.36 (ICD-10-CM) - DDD (degenerative disc disease), lumbar   Rehab Codes: M54.59, M79.652, M62.81  Onset Date: 22               Next 's appt. : 22      Visit# / total visits: ; Progress note for Medicare patient due at visit 6     Cancels/No Shows: 0/0    Subjective:    Pain:  [] Yes  [x] No Location: low back Pain Rating: (0-10 scale) 0/10  Pain altered Tx:  [x] No  [] Yes  Action:  Comments:  Pt reports no pain. Just returned from [address]. Pt reported having some fatigue going up the ramp on her cruise, but no pain going down the ramp.      Objective:  Modalities:   Precautions:  Exercises: bolded completed 22   Exercise Reps/ Time Weight/ Level Comments   Nustep 5 lv1          PRONE      Prone lay  2 min     Prone lay propped on elbows 2 min     Quad stretch 3x30ea     HS curls 10 ea           SUPINE      HS stretch 3x30 ea     Quad sets 2x10 ea     SLR w/ quad set 2x5 ea  More fatigue on left   Supine clamshells 2x10      bridges 2x10           SIDELINE      Hip abduction 10 ea  More fatigue on left   Clamshells 10 ea  More fatigue on left         STANDING      Stair ascent      Stair descent      Lateral side steps 2x30ft     Monster walks 30ft     Backwards monster walks 30ft     BALANCE      SLS    Able to balance on left for 4 sec  Able to balance on right for 8 sec   tandem 2x30\" ea     DLS balance 62\"  Foam     DLS Balance eyes closed 30\" foam    Other:      Treatment Charges: Mins Units   []  Modalities     [x]  Ther Exercise 50 3   []  Manual Therapy     []  Ther Activities     []  Aquatics     []  Vasocompression     []  Other     Total Treatment time 50 3   Estimated medicare Cost (as of 4/29/22): $195.74    Assessment: [x] Progressing toward goals. Pt reported no increase in pain at the start of the session. Pt reported feeling more fatigued on the left while performing SLR with quad sets. Cued pt to keep gaze forward during lateral side steps facing the mirror to assist with visualizing posture. Cued pt to take smaller steps during backwards monster walks and pt adjusted appropriately. Attempted to practice SLS in the parallel bars to focus on balance, but pt struggled, so focused on tandem balancing and DLS on foam pad with eyes open and closed and pt was able to perform these exercises much better while still being a challenge. Pt reported some fatigue at the end of the session, but no increase in pain. [] No change. [] Other:  [x] Patient would continue to benefit from skilled physical therapy services in order to: to improve pain, strength, and activity tolerance to maintain current level of independence  Problems:    [x] ? Back/left hip Pain: 0-6/10                                      [x] ? ROM: decreased hip girdle flexibility                    [x] ? Strength:  Decreased hip girdle strength  [x] ?  Function: LEFI: 65% max function  [] Postural Deviations  [x] Gait Deviations: TU\", compensated Trendlenburg gait   [] Other:                       STG: (to be met in 6 treatments)  ? Pain: Pt will report a max of 3-4/10 left hip and low back pain when sitting for periods of time, lying, standing in place, and walking in order to improve tolerance to sitting, sleep, and ADL completion. ? Strength: Pt will be able to complete 5 STS from an elevated surface without the use of UE support in order to demonstrate improved LE strength for functional transfers and mobility. ? Function: Pt will be able to complete the TUG in less than or equal to 12\" in order to demonstrate an improvement in safe walking and transitions. Independent with Home Exercise Programs  Demonstrate Knowledge of fall prevention- NOT NEEDED  LTG: (to be met in 12 treatments)  ? Pain:  Pt will report a max of 2-3/10 left hip and low back pain when sitting for periods of time, lying, standing in place, and walking in order to improve tolerance to sitting, sleep, and ADL completion. ? ROM: Pt will be able to demonstrate AROM in lumbar spine an left hip in all planes with 0/10 left hip or low back pain in order to demonstrate an improvement in sitting and laying tolerance and mobility. ? Strength: Pt will be able to complete at least 5 sit to stands from a standard height chair within 30 seconds without the use of her UEs in order to demonstrate an improvement in LE strength and tolerance to transfers. Pt will be able to demonstrate a 4+/5 grossly on hip girdle strength in order to promote pelvic stability and strength when standing and walking. Gait: Pt will be able to complete a 6MWT with reports of mild increase in left hip pain in order to improve tolerance to walking for extended periods of time in the community. ? Function: Pt will improve score on the LEFI to greater than or equal to 80% in order to demonstrate an improvement in funciton. Patient goals:  \"Avoid back surgery\"       Pt. Education:  [x] Yes  [] No  [x] Reviewed Prior HEP/Ed  Method of Education: [x] Verbal  [] Demo  [] Written  Comprehension of Education:  [x] Verbalizes understanding. [] Demonstrates understanding. [] Needs review. [] Demonstrates/verbalizes HEP/Ed previously given. Plan: [x] Continue current frequency toward long and short term goals. [x] Specific Instructions for subsequent treatments: update HEP, progress as tolerated, SLS balancing, and stair ascent/descent.        Time In: 905           Time Out: 1001    Electronically signed by:  LAURA Lamb  This documentation has been reviewed and entirety of treatment session with direct supervision by clinical instructor, Aracely Monroe, PT, DPT

## 2022-05-16 ENCOUNTER — HOSPITAL ENCOUNTER (OUTPATIENT)
Dept: PHYSICAL THERAPY | Facility: CLINIC | Age: 82
Setting detail: THERAPIES SERIES
Discharge: HOME | End: 2022-05-16
Payer: MEDICARE

## 2022-05-16 NOTE — FLOWSHEET NOTE
[] [facility]) - [facility] &  Therapy  [address]    [phone]  F: [phone]   [x] [address]  [facility]   [address]  P: [phone]  F: [phone]  [] [place]  [address]  P: [phone]  F: [phone] [] [address]  P: [phone]  F: [phone]  [] [address]  [facility]   [address]   [place]: [phone]  F: [phone]   [] [address]  [place]: [phone]   F: [phone]     Physical Therapy Cancel/No Show note    Date: 2022  Patient: Eryn Boucher  : 1940  MRN: [id number]    Cancels/No Shows to date:     For today's appointment patient:    [x]  Cancelled    [] Rescheduled appointment    [] No-show     Reason given by patient:    [x]  Patient ill    []  Conflicting appointment    [] No transportation      [] Conflict with work    [] No reason given    [] Weather related    [] [id number]    [] Other:      Comments:        [] Next appointment was confirmed    Electronically signed by: Lala Gutierrez PT

## 2022-05-20 ENCOUNTER — HOSPITAL ENCOUNTER (OUTPATIENT)
Dept: PHYSICAL THERAPY | Facility: CLINIC | Age: 82
Setting detail: THERAPIES SERIES
Discharge: HOME | End: 2022-05-20
Payer: MEDICARE

## 2022-05-20 PROCEDURE — 97110 THERAPEUTIC EXERCISES: CPT

## 2022-05-20 NOTE — FLOWSHEET NOTE
[] [facility]) - [facility] &  Therapy  [address]  [phone]  F: [phone] [x] [address]  [facility]   [address]  P: [phone]  F: [phone] [] [address]  [address]  P: [phone]  F: [phone] [] [address]  P: [phone]  F: [phone] [] [address]  [facility]   [address]   [place]: [phone]  F: [phone]      Physical Therapy Daily Treatment Note    Date:  2022  Patient Name:  Angel Borja    :  1940  MRN: [id number]  Physician: Erika Jin PA-C                              Insurance: Medicare/[place] (Medicare Guidelines)  Medical Diagnosis:   S59.399 (ICD-10-CM) - History of total hip replacement, bilateral   M70.62 (ICD-10-CM) - Greater trochanteric bursitis of left hip   M51.36 (ICD-10-CM) - DDD (degenerative disc disease), lumbar   Rehab Codes: M54.59, M79.652, M62.81  Onset Date: 22               Next 's appt. : 22      Visit# / total visits: 3/12; Progress note for Medicare patient due at visit 6     Cancels/No Shows: 1/0    Subjective:    Pain:  [] Yes  [x] No Location: low back Pain Rating: (0-10 scale) 0/10  Pain altered Tx:  [x] No  [] Yes  Action:  Comments:  Pt reports no pain.     Objective:  Modalities:   Precautions:  Exercises: Access Code: [email or web address] (use this access code for all additional exercises)  bolded completed 22   Exercise Reps/ Time Weight/ Level Comments   Nustep 5 lv1          PRONE      Prone lay  2 min     Prone lay propped on elbows 2 min     Quad stretch 3x30ea     HS curls 2x10 ea 3lb ankle weight Added weight          SUPINE      HS stretch 3x30 ea     Quad sets 2x10 ea     SLR w/ quad set 2x5 ea     Supine clamshells 2x10  lime Increased resistance 5/20         bridges 2x10 lime Increased resistance 5/20   Unilateral hip abduction 2x10 ea lime Added 5/20         SIDELINE      Hip abduction 10 ea     Clamshells 10 ea lime Increased resistance 5/20         STANDING      Stair ascent      Stair descent      Lateral side steps 2x30ft lime    Monster walks 30ft lime    Backwards monster walks 30ft lime    4 way hip 1x10 ea lime Added 5/20   BALANCE      SLS    Able to balance on left for 4 sec  Able to balance on right for 8 sec   tandem 2x30\" ea     DLS balance 1M60\"  Foam  Eyes closed         DLS balance ball diagonals 20 each direction Foam  volleyball Started top of left shoulder to bottom of right hip and vice versa   DLS balance ball tosses 30 tosses volleyball    Other:  Specific Instructions for subsequent treatments: update HEP, progress as tolerated, SLS balancing, and stair ascent/descent. Treatment Charges: Mins Units   []  Modalities     [x]  Ther Exercise 53 4   []  Manual Therapy     []  Ther Activities     []  Aquatics     []  Vasocompression     []  Other     Total Treatment time 53 4   Estimated medicare Cost (as of 05/20/22 ): $293.47    Assessment: [x] Progressing toward goals. Pt reported no pain in the beginning of the session. Progressed exercises by adding resistance to supine clamshells and bridges and to sideline clamshells. Pt was fatigued at the end of sideline clamshells and began to lose form towards the end. Added weight to the HS curls to increase strength and pt tolerated exercise well. Added in hip unilateral abduction with the resistance band to work on single leg strength and pt noted feeling more fatigue in the right leg. Added resistance to lateral side steps and monster walks and cued pt to keep eyes forward and pt continued to do well with the exercise. Focused on balance at the end of the session by doing DLS on the foam pad while doing UE movements and ball tosses.  No losses of balance noted during balance activities and will progress DOMONIQUE next session. Pt performed exercises well today, but required some sitting breaks in between some standing exercises. Gave pt new resistance band to take home to continue doing HEP. [] No change. [] Other:  [x] Patient would continue to benefit from skilled physical therapy services in order to: to improve pain, strength, and activity tolerance to maintain current level of independence  Problems:    [x] ? Back/left hip Pain: 0-6/10                                      [x] ? ROM: decreased hip girdle flexibility                    [x] ? Strength:  Decreased hip girdle strength  [x] ? Function: LEFI: 65% max function  [] Postural Deviations  [x] Gait Deviations: TU\", compensated Trendlenburg gait   [] Other:                       STG: (to be met in 6 treatments)  1. ? Pain: Pt will report a max of 3-4/10 left hip and low back pain when sitting for periods of time, lying, standing in place, and walking in order to improve tolerance to sitting, sleep, and ADL completion. 2. ? Strength: Pt will be able to complete 5 STS from an elevated surface without the use of UE support in order to demonstrate improved LE strength for functional transfers and mobility. 3. ? Function: Pt will be able to complete the TUG in less than or equal to 12\" in order to demonstrate an improvement in safe walking and transitions. 4. Independent with Home Exercise Programs  5. Demonstrate Knowledge of fall prevention- NOT NEEDED  LTG: (to be met in 12 treatments)  1. ? Pain:  Pt will report a max of 2-3/10 left hip and low back pain when sitting for periods of time, lying, standing in place, and walking in order to improve tolerance to sitting, sleep, and ADL completion.   2. ? ROM: Pt will be able to demonstrate AROM in lumbar spine an left hip in all planes with 0/10 left hip or low back pain in order to demonstrate an improvement in sitting and laying tolerance and mobility. 3. ? Strength: Pt will be able to complete at least 5 sit to stands from a standard height chair within 30 seconds without the use of her UEs in order to demonstrate an improvement in LE strength and tolerance to transfers. a. Pt will be able to demonstrate a 4+/5 grossly on hip girdle strength in order to promote pelvic stability and strength when standing and walking. 4. Gait: Pt will be able to complete a 6MWT with reports of mild increase in left hip pain in order to improve tolerance to walking for extended periods of time in the community. 5. ? Function: Pt will improve score on the LEFI to greater than or equal to 80% in order to demonstrate an improvement in funciton. Patient goals: \"Avoid back surgery\"       Pt. Education:  [x] Yes  [] No  [x] Reviewed Prior HEP/Ed  Method of Education: [x] Verbal  [] Demo  [] Written  Access Code: [email or web address]  Exercises  Supine Lower Trunk Rotation - 2-3 x daily - 10 reps  Hook Lying Single Knee to Chest Stretch with Towel - 1-3 x daily - 10 reps - 5\" hold  Modified Truman Stretch - 1 x daily - 3 sets - 20\" hold  Supine Bridge with Resistance Band - 1 x daily - 3 sets - 5 reps  Hooklying Clamshell with Resistance - 1 x daily - 2 sets - 10 reps  Seated Hamstring Stretch - 1 x daily - 3 sets - 20-30\" hold  Seated Hip Abduction with Resistance - 1 x daily - 2 sets - 10 reps  Comprehension of Education:  [x] Verbalizes understanding. [] Demonstrates understanding. [] Needs review. [] Demonstrates/verbalizes HEP/Ed previously given. Plan: [x] Continue current frequency toward long and short term goals. [x] Specific Instructions for subsequent treatments: update HEP, progress as tolerated, SLS balancing, and stair ascent/descent.        Time In: 955 am           Time Out: 1058 am    Electronically signed by:  LAURA Farias  This documentation has been reviewed and entirety of treatment session with direct supervision by clinical instructor, Jim Bal Rick Cameron, PT, DPT

## 2022-05-23 ENCOUNTER — HOSPITAL ENCOUNTER (OUTPATIENT)
Dept: PHYSICAL THERAPY | Facility: CLINIC | Age: 82
Setting detail: THERAPIES SERIES
Discharge: HOME | End: 2022-05-23
Payer: MEDICARE

## 2022-05-23 PROCEDURE — 97110 THERAPEUTIC EXERCISES: CPT

## 2022-05-23 NOTE — FLOWSHEET NOTE
[] [facility]) - [facility] CENTER &  Therapy  [address]  [phone]  F: [phone] [x] [address]  [place] [address]   [address]  P: [phone]  F: [phone] [] [address]  [address]  P: [phone]  F: [phone] [] [address]  P: [phone]  F: [phone] [] [address]  [facility]   [address]   [place]: [phone]  F: [phone]      Physical Therapy Daily Treatment Note    Date:  2022  Patient Name:  Samira Truong    :  1940  MRN: [id number]  Physician: Dallas Brizuela PA-C                              Insurance: Medicare/[place] (Medicare Guidelines)  Medical Diagnosis:   L44.231 (ICD-10-CM) - History of total hip replacement, bilateral   M70.62 (ICD-10-CM) - Greater trochanteric bursitis of left hip   M51.36 (ICD-10-CM) - DDD (degenerative disc disease), lumbar   Rehab Codes: M54.59, M79.652, M62.81  Onset Date: 22               Next 's appt. : 22      Visit# / total visits: ; Progress note for Medicare patient due at visit 6     Cancels/No Shows: 1/0    Subjective:    Pain:  [] Yes  [x] No Location: low back Pain Rating: (0-10 scale) 0/10  Pain altered Tx:  [x] No  [] Yes  Action:  Comments:  Pt arrives with mild ms soreness and stiffness throughout. Reports working in her yard over the weekend.      Objective:  Modalities:   Precautions:  Exercises: Access Code: [email or web address] (use this access code for all additional exercises)  bolded completed 22   Exercise Reps/ Time Weight/ Level Comments   Nustep 5 lv1          PRONE      Prone lay  2 min     Prone lay propped on elbows 2 min     Quad stretch 3x30ea     HS curls 2x10 ea 3lb ankle weight Added weight 5/20         SUPINE HS stretch 3x30 ea     Quad sets 2x10 ea     SLR w/ quad set 2x5 ea     Supine clamshells 2x10  lime Increased resistance 5/20         bridges 2x10 lime Increased resistance 5/20   Unilateral hip abduction 2x10 ea lime Added 5/20         SIDELINE      Hip abduction 10 ea     Clamshells 10 ea lime Increased resistance 5/20         STANDING      STS off elevated mat 5x3  Added 5/23 1st set with hands, other sets without UE's    Stair ascent      Stair descent      Lateral side steps 2x30ft lime    Monster walks 30ft lime    Backwards monster walks 30ft lime    4 way hip 1x10 ea lime Added 5/20; no ADD on 5/23   BALANCE      SLS    Able to balance on left for 4 sec  Able to balance on right for 8 sec   tandem 2x30\" ea     DLS NBOS balance 5O89\"  Foam  Eyes closed   High marches  20x2\"  Added 5/23; light UE's   DLS balance ball diagonals 20 each direction Foam  2.2# yellow ball Started top of left shoulder to bottom of right hip and vice versa; increased weight 5/23   DLS balance on foam chest press 20x 2.2# yellow ball  Added 5/23   DLS balance ball tosses 30 tosses volleyball    Other:  Specific Instructions for subsequent treatments: update HEP, progress as tolerated, SLS balancing, and stair ascent/descent. Treatment Charges: Mins Units   []  Modalities     [x]  Ther Exercise 55 4   []  Manual Therapy     []  Ther Activities     []  Aquatics     []  Vasocompression     []  Other     Total Treatment time 55 4   Estimated medicare Cost (as of 05/23/22 ): $391.20    Assessment: [x] Progressing toward goals. Continued mat stretches and strengthening as detailed above with ms fatigue noted towards end of resisted hip abductor exercises. Implemented STS's off elevated mat table with first set completed using UE's and next 2 sets without. Vc's needed to reduce valgus at knees and for proper positioning throughout.  Progressed to weighted ball for chopping exercises with addition of chest press while on foam to further improvement in sitting and laying tolerance and mobility. 3. ? Strength: Pt will be able to complete at least 5 sit to stands from a standard height chair within 30 seconds without the use of her UEs in order to demonstrate an improvement in LE strength and tolerance to transfers. a. Pt will be able to demonstrate a 4+/5 grossly on hip girdle strength in order to promote pelvic stability and strength when standing and walking. 4. Gait: Pt will be able to complete a 6MWT with reports of mild increase in left hip pain in order to improve tolerance to walking for extended periods of time in the community. 5. ? Function: Pt will improve score on the LEFI to greater than or equal to 80% in order to demonstrate an improvement in funciton. Patient goals: \"Avoid back surgery\"       Pt. Education:  [x] Yes  [] No  [x] Reviewed Prior HEP/Ed  Method of Education: [x] Verbal  [x] Demo STS's for proper form, high march  [] Written  Access Code: [email or web address]  Exercises  Supine Lower Trunk Rotation - 2-3 x daily - 10 reps  Hook Lying Single Knee to Chest Stretch with Towel - 1-3 x daily - 10 reps - 5\" hold  Modified Truman Stretch - 1 x daily - 3 sets - 20\" hold  Supine Bridge with Resistance Band - 1 x daily - 3 sets - 5 reps  Hooklying Clamshell with Resistance - 1 x daily - 2 sets - 10 reps  Seated Hamstring Stretch - 1 x daily - 3 sets - 20-30\" hold  Seated Hip Abduction with Resistance - 1 x daily - 2 sets - 10 reps  Comprehension of Education:  [x] Verbalizes understanding. [] Demonstrates understanding. [] Needs review. [] Demonstrates/verbalizes HEP/Ed previously given. Plan: [x] Continue current frequency toward long and short term goals. [x] Specific Instructions for subsequent treatments: update HEP, progress as tolerated, SLS balancing, and stair ascent/descent.        Time In: 10:00 am          Time Out: 11:00 am    Electronically signed by:  Milady Hearn PTA

## 2022-05-27 ENCOUNTER — HOSPITAL ENCOUNTER (OUTPATIENT)
Dept: PHYSICAL THERAPY | Facility: CLINIC | Age: 82
Setting detail: THERAPIES SERIES
Discharge: HOME | End: 2022-05-27
Payer: MEDICARE

## 2022-05-27 PROCEDURE — 97110 THERAPEUTIC EXERCISES: CPT

## 2022-05-27 NOTE — FLOWSHEET NOTE
[] [facility]) - [facility] &  Therapy  [address]  [phone]  F: [phone] [x] [address]  [facility]   [address]  P: [phone]  F: [phone] [] [address]  [address]  P: [phone]  F: [phone] [] [address]  P: [phone]  F: [phone] [] [address]  [facility]   [address]   [place]: [phone]  F: [phone]      Physical Therapy Daily Treatment Note    Date:  2022  Patient Name:  Sanchez Han    :  1940  MRN: [id number]  Physician: Alayna Cordero PA-C                              Insurance: Medicare/[place] (Medicare Guidelines)  Medical Diagnosis:   W92.623 (ICD-10-CM) - History of total hip replacement, bilateral   M70.62 (ICD-10-CM) - Greater trochanteric bursitis of left hip   M51.36 (ICD-10-CM) - DDD (degenerative disc disease), lumbar   Rehab Codes: M54.59, M79.652, M62.81  Onset Date: 22               Next 's appt. : 22      Visit# / total visits: ; Progress note for Medicare patient due at visit 6     Cancels/No Shows: 1/0    Subjective:    Pain:  [] Yes  [x] No Location: low back Pain Rating: (0-10 scale) 4-5/10  Pain altered Tx:  [x] No  [] Yes  Action:  Comments:  Pt arrives with some pain in her low back and reports feeling more pain when she pushes her back into a chair and getting out of a car.      Objective:  Modalities:   Precautions:  Exercises: Access Code: [email or web address] (use this access code for all additional exercises)  bolded completed 22   Exercise Reps/ Time Weight/ Level Comments   Nustep 5 lv1          PRONE      Prone lay  2 min     Prone lay propped on elbows 2 min     Quad stretch 3x30ea     HS curls 2x10 ea 3lb ankle weight Added weight 5/20         SUPINE      HS stretch 3x30 ea     Quad sets 2x10 ea     SLR w/ quad set 2x5 ea     Supine clamshells 2x10 lime Increase resistance next session  Increased reps 5/27         Bridges with hip adduction 2x10 lime Increase resistance next session  Added 5/27   Unilateral hip abduction 2x10 ea lime Added 5/20  Increase resistance next session         SIDELINE      Hip abduction 10 ea lime Increased resistance 5/27   Clamshells 10 ea lime Increased resistance 5/20         STANDING      STS off elevated mat 5x3  Added 5/23 1st set with hands, other sets without UE's    Stair ascent      Stair descent      Lateral side steps 4x30ft lime Increased reps 5/27  Band above knees   Monster walks 2x30ft lime Increased reps 5/27  Band above knees   Backwards monster walks 2x30ft lime Increased reps 5/27  Band above knees   Lat pull downs  2x15  purple    paloff press 10 each purple    Calf raises 2x15           4 way hip 1x10 ea lime Added 5/20; no ADD on 5/23   BALANCE      SLS    Able to balance on left for 4 sec  Able to balance on right for 8 sec   tandem 2x30\" ea     DLS NBOS balance 4Q57\"  Foam  Eyes closed   High marches  20x2\"  Added 5/23; light UE's   DLS balance ball diagonals 20 each direction Foam  2.2# yellow ball Started top of left shoulder to bottom of right hip and vice versa; increased weight 5/23   Mini squats with medball 10 4# medball    DLS balance on foam side to side rotations 10 each direction 4# medball    DLS balance on foam chest press 20x 4# medball Added 5/23  Increased weight 5/27   DLS balance ball tosses 30 tosses volleyball    Other:  Specific Instructions for subsequent treatments: update HEP, progress as tolerated, SLS balancing, and stair ascent/descent.      Treatment Charges: Mins Units   []  Modalities     [x]  Ther Exercise 50 3   []  Manual Therapy     []  Ther Activities     []  Aquatics     []  Vasocompression     []  Other     Total Treatment time 50 3 Estimated medicare Cost (as of 22 ): $466.09    Assessment: [x] Progressing toward goals. Progressing exercises each session to improve strength and endurance. Increased reps today for supine clamshells, lateral side steps, and forward and backward monster walks to improve endurance. Increased weight for the DLS balance on foam pad while doing chest press. Increased resistance for sideline hip abduction and was able to continue completing with proper execution. Pt reported having more trouble lifting the right than the left, so made adjustments by putting a pillow under the left hip and exercise was performed better after making the postural adjustment. Pt tolerated the increases well and is always willing to make exercises more challenging. Added in some UE exercises today to focus on functional movements and stability. Pt did well with the mini squats while holding the medball and is continuing to progress balance exercises and improve skills. Will continue to increase strength and decrease pain levels in future sessions. [] No change. [] Other:  [x] Patient would continue to benefit from skilled physical therapy services in order to: to improve pain, strength, and activity tolerance to maintain current level of independence  Problems:    [x] ? Back/left hip Pain: 0-6/10                                      [x] ? ROM: decreased hip girdle flexibility                    [x] ? Strength:  Decreased hip girdle strength  [x] ? Function: LEFI: 65% max function  [] Postural Deviations  [x] Gait Deviations: TU\", compensated [place] gait   [] Other:                       STG: (to be met in 6 treatments)  1. ? Pain: Pt will report a max of 3-4/10 left hip and low back pain when sitting for periods of time, lying, standing in place, and walking in order to improve tolerance to sitting, sleep, and ADL completion.   2. ? Strength: Pt will be able to complete 5 STS from an elevated surface without the with Resistance Band - 1 x daily - 3 sets - 5 reps  Hooklying Clamshell with Resistance - 1 x daily - 2 sets - 10 reps  Seated Hamstring Stretch - 1 x daily - 3 sets - 20-30\" hold  Seated Hip Abduction with Resistance - 1 x daily - 2 sets - 10 reps  Comprehension of Education:  [x] Verbalizes understanding. [] Demonstrates understanding. [] Needs review. [] Demonstrates/verbalizes HEP/Ed previously given. Plan: [x] Continue current frequency toward long and short term goals. [x] Specific Instructions for subsequent treatments: update HEP, progress as tolerated, SLS balancing, and stair ascent/descent.        Time In: 10:00 am          Time Out: 11:00 am    Electronically signed by:  LAURA Mejía  This documentation has been reviewed and entirety of treatment session with direct supervision by clinical instructor, Julianne Rick, PT, DPT

## 2022-05-31 ENCOUNTER — HOSPITAL ENCOUNTER (OUTPATIENT)
Dept: PHYSICAL THERAPY | Facility: CLINIC | Age: 82
Setting detail: THERAPIES SERIES
Discharge: HOME | End: 2022-05-31
Payer: MEDICARE

## 2022-05-31 PROCEDURE — 97110 THERAPEUTIC EXERCISES: CPT

## 2022-05-31 NOTE — FLOWSHEET NOTE
[] [facility]) - [facility] &  Therapy  [address]  [phone]  F: [phone] [x] [address]  [facility]   [address]  P: [phone]  F: [phone] [] [address]  [address]  P: [phone]  F: [phone] [] [address]  P: [phone]  F: [phone] [] [address]  [facility]   [address]   [place]: [phone]  F: [phone]      Physical Therapy Daily Treatment Note    Date:  2022  Patient Name:  Nate Das    :  1940  MRN: [id number]  Physician: Juel Holstein, PA-C                              Insurance: Medicare/[place] (Medicare Guidelines)  Medical Diagnosis:   F61.348 (ICD-10-CM) - History of total hip replacement, bilateral   M70.62 (ICD-10-CM) - Greater trochanteric bursitis of left hip   M51.36 (ICD-10-CM) - DDD (degenerative disc disease), lumbar   Rehab Codes: M54.59, M79.652, M62.81  Onset Date: 22               Next 's appt. : 22      Visit# / total visits: ; Progress note for Medicare patient due at visit 6     Cancels/No Shows: 1/0    Subjective:    Pain:  [] Yes  [x] No Location: low back Pain Rating: (0-10 scale) 0/10  Pain altered Tx:  [x] No  [] Yes  Action:  Comments: Pt arrives noting sight irritation in LB which is typical for her. Reports she didn't do much over the weekend but did work on her HEP. Mentions she went up/down a few steps on a ladder to retrieve items from her attic w/o issue.      Objective:  Modalities:   Precautions:  Exercises: Access Code: [email or web address] (use this access code for all additional exercises)  bolded completed 22   Exercise Reps/ Time Weight/ Level Comments   Nustep 5 lv1          PRONE      Prone lay  2 min     Prone lay propped on elbows 2 min     Quad stretch 3x30ea     HS curls 3x10 ea 3lb ankle weight Added weight 5/20; self progressed reps 5/31         SUPINE      HS stretch 3x30 ea     Quad sets 2x10 ea     SLR w/ quad set 2x5 ea     Supine clamshells 2x10 Blue  Increased resistance 5/31  Increased reps 5/27         Bridges with hip adduction 2x10 Blue; ball Increased resistance 5/31   Added 5/27   Unilateral hip abduction 2x10 ea Blue  Added 5/20  Increased resistance 5/31          SIDELINE      Hip abduction 10 ea lime Increased resistance 5/27   Clamshells 10 ea lime Increased resistance 5/20         STANDING      STS off elevated mat 5x3  Added 5/23 1st set with hands, other sets without UE's    Stair ascent      Stair descent      Lateral side steps 2x30ft  ea lime Increased reps 5/27  Band at knees for 1 set & ankles 1 set   Monster walks 2x30ft lime Increased reps 5/27  Band above knees   Backwards monster walks 2x30ft lime Increased reps 5/27  Band above knees   Lat pull downs  2x15  purple    paloff press 10 each purple    Calf raises 2x15           4 way hip 1x10 ea lime Added 5/20         BALANCE      SLS  x  Able to balance on left for 8 sec  Able to balance on right for 7 sec   tandem 2x30\" ea     DLS NBOS balance 7C30\"  Foam  Eyes closed   High marches  20x2\"  Added 5/23; light UE's   DLS balance ball diagonals 20 each direction Foam  2.2# yellow ball Started top of left shoulder to bottom of right hip and vice versa; increased weight 5/23   Mini squats with medball 10 4# medball    DLS balance on foam side to side rotations 10 each direction 4# medball    DLS balance on foam chest press 20x 4# medball Added 5/23  Increased weight 5/27   DLS balance ball tosses 30 tosses volleyball    Other:  Specific Instructions for subsequent treatments: update HEP, progress as tolerated, SLS balancing, and stair ascent/descent.      Treatment Charges: Mins Units   []  Modalities     [x]  Ther Exercise 55 4   []  Manual Therapy     []  Ther Activities     []  Aquatics     []  Vasocompression     []  Other     Total Treatment time 55 4   Estimated medicare Cost (as of 22 ): $563.82    Assessment: [x] Progressing toward goals. Progressed resistance for supine hip strengthening which presents a good challenge. Pt self progressed reps for weighted hs curls in prone and notes slight irritation on R side. Vc's to reduce R UT compensation and for proper form during lat pull downs. Cueing required to reduce valgus alignment at knees with mini squats, pt with fair carryover. Seated rest breaks x2 throughout session d/t muscular fatigue and heavier breathing. Pt fatigued at end of session but denies any pain. Plan to reassess STG's next session. [] No change. [] Other:  [x] Patient would continue to benefit from skilled physical therapy services in order to: to improve pain, strength, and activity tolerance to maintain current level of independence  Problems:    [x] ? Back/left hip Pain: 0-6/10                                      [x] ? ROM: decreased hip girdle flexibility                    [x] ? Strength:  Decreased hip girdle strength  [x] ? Function: LEFI: 65% max function  [] Postural Deviations  [x] Gait Deviations: TU\", compensated [place] gait   [] Other:                       STG: (to be met in 6 treatments)  1. ? Pain: Pt will report a max of 3-4/10 left hip and low back pain when sitting for periods of time, lying, standing in place, and walking in order to improve tolerance to sitting, sleep, and ADL completion. 2. ? Strength: Pt will be able to complete 5 STS from an elevated surface without the use of UE support in order to demonstrate improved LE strength for functional transfers and mobility. 3. ? Function: Pt will be able to complete the TUG in less than or equal to 12\" in order to demonstrate an improvement in safe walking and transitions.    4. Independent with Home Exercise Programs  5. Demonstrate Knowledge of fall prevention- NOT NEEDED  LTG: (to be met in 12 treatments)  1. ? Pain:  Pt will report a max of 2-3/10 left hip and low back pain when sitting for periods of time, lying, standing in place, and walking in order to improve tolerance to sitting, sleep, and ADL completion. 2. ? ROM: Pt will be able to demonstrate AROM in lumbar spine an left hip in all planes with 0/10 left hip or low back pain in order to demonstrate an improvement in sitting and laying tolerance and mobility. 3. ? Strength: Pt will be able to complete at least 5 sit to stands from a standard height chair within 30 seconds without the use of her UEs in order to demonstrate an improvement in LE strength and tolerance to transfers. a. Pt will be able to demonstrate a 4+/5 grossly on hip girdle strength in order to promote pelvic stability and strength when standing and walking. 4. Gait: Pt will be able to complete a 6MWT with reports of mild increase in left hip pain in order to improve tolerance to walking for extended periods of time in the community. 5. ? Function: Pt will improve score on the LEFI to greater than or equal to 80% in order to demonstrate an improvement in funciton. Patient goals: \"Avoid back surgery\"       Pt. Education:  [x] Yes  [] No  [x] Reviewed Prior HEP/Ed  Method of Education: [x] Verbal form  [x] Demo squats  [] Written  Access Code: [email or web address]  Exercises  Supine Lower Trunk Rotation - 2-3 x daily - 10 reps  Hook Lying Single Knee to Chest Stretch with Towel - 1-3 x daily - 10 reps - 5\" hold  Modified Truman Stretch - 1 x daily - 3 sets - 20\" hold  Supine Bridge with Resistance Band - 1 x daily - 3 sets - 5 reps  Hooklying Clamshell with Resistance - 1 x daily - 2 sets - 10 reps  Seated Hamstring Stretch - 1 x daily - 3 sets - 20-30\" hold  Seated Hip Abduction with Resistance - 1 x daily - 2 sets - 10 reps  Comprehension of Education:  [x] Verbalizes understanding.   [] Demonstrates understanding. [] Needs review. [] Demonstrates/verbalizes HEP/Ed previously given. Plan: [x] Continue current frequency toward long and short term goals. [x] Specific Instructions for subsequent treatments: update HEP, progress as tolerated, SLS balancing, and stair ascent/descent.        Time In: 10:00 am       Time Out: 11:00 am    Electronically signed by:  Sagar Chaves PTA

## 2022-06-03 ENCOUNTER — HOSPITAL ENCOUNTER (OUTPATIENT)
Dept: PHYSICAL THERAPY | Facility: CLINIC | Age: 82
Setting detail: THERAPIES SERIES
Discharge: HOME | End: 2022-06-03
Payer: MEDICARE

## 2022-06-03 PROCEDURE — 97110 THERAPEUTIC EXERCISES: CPT

## 2022-06-03 NOTE — FLOWSHEET NOTE
[] [facility]) - [facility] &  Therapy  [address]  [phone]  F: [phone] [x] [address]  [facility]   [address]  P: [phone]  F: [phone] [] [address]  [address]  P: [phone]  F: [phone] [] [address]  P: [phone]  F: [phone] [] [address]  [facility]   [address]   [place]: [phone]  F: [phone]      Physical Therapy Daily Treatment Note    Date:  6/3/2022  Patient Name:  Jayla Hamilton    :  1940  MRN: [id number]  Physician: Nelson Russell PA-C                              Insurance: Medicare/[place] (Medicare Guidelines)  Medical Diagnosis:   N56.995 (ICD-10-CM) - History of total hip replacement, bilateral   M70.62 (ICD-10-CM) - Greater trochanteric bursitis of left hip   M51.36 (ICD-10-CM) - DDD (degenerative disc disease), lumbar   Rehab Codes: M54.59, M79.652, M62.81  Onset Date: 22               Next 's appt. : 22  Visit# / total visits: ; Progress note for Medicare patient due at visit 12     Cancels/No Shows: 1/0    Subjective:    Pain:  [] Yes  [x] No Location: low back Pain Rating: (0-10 scale) 0/10  Pain altered Tx:  [x] No  [] Yes  Action:  Comments: Pt arrives not having any pain. Pt reports having a pain of 5.5/10 when she sits, stands, and bends over.      Objective:  Modalities:   Precautions:  Exercises: Access Code: [email or web address] (use this access code for all additional exercises)  bolded completed 22   Exercise Reps/ Time Weight/ Level Comments   Nustep 5 lv1          PRONE      Prone lay  2 min     Prone lay propped on elbows 2 min     Quad stretch 3x30ea     HS curls 3x10 ea 3lb ankle weight Added weight 5/20; self progressed reps 5/31         SUPINE      HS stretch 3x30 ea     Supine clamshells 2x10 Blue  Increased resistance 5/31  Increased reps 5/27         Bridges with hip adduction 2x12 Blue; ball Increased reps 6/3  Added 5/27   Unilateral hip abduction 2x12 ea Blue  Added 5/20  Increased reps 6/3         SIDELINE      Hip abduction 10 ea lime Increased resistance 5/27   Clamshells 2x10 ea blue Increased resistance 6/3  Increased sets 6/3         STANDING      STS off elevated mat 5x3  Added 5/23 1st set with hands, other sets without UE's    Stair ascent      Stair descent      Lateral side steps 2x30ft  ea lime Increased reps 5/27  Band at knees for 1 set & ankles 1 set   Monster walks 2x30ft lime Increased reps 5/27  Band above knees   Backwards monster walks 2x30ft lime Increased reps 5/27  Band above knees   Lat pull downs  2x15  purple    paloff press 10 each purple    Calf raises 2x15           4 way hip 1x10 ea lime Added 5/20         BALANCE      SLS  x  Able to balance on left for 8 sec  Able to balance on right for 7 sec   tandem 2x30\" ea     DLS NBOS balance 2D56\"  Foam  Eyes closed   High marches  20x2\"  Added 5/23; light UE's   DLS balance ball diagonals 20 each direction Foam  2.2# yellow ball Started top of left shoulder to bottom of right hip and vice versa; increased weight 5/23   lunges 10 ea  Parallel bars  Added 6/3   Mini squats  2x10  Band above knees  Increased sets 6/3   DLS balance on foam side to side rotations 10 each direction 4# medball    DLS balance on foam chest press 20x 4# medball Added 5/23  Increased weight 5/27   DLS balance ball tosses 30 tosses volleyball    DLS on foam  10 each 6lb medball 1st set: chest pass without passing  2nd set: start at hips and bring up to chest  3rd set: start at chest and raise medball overhead         Other:  Specific Instructions for subsequent treatments: update HEP, progress as tolerated, SLS balancing, and stair ascent/descent.      Treatment Charges: Mins Units []  Modalities     [x]  Ther Exercise 50 3   []  Manual Therapy     []  Ther Activities     []  Aquatics     []  Vasocompression     []  Other     Total Treatment time 50 3   Estimated medicare Cost (as of 22 ): $638.71    Assessment: [x] Progressing toward goals. Pt came in today not having any pain. Reassessed pts goals. Pt reported having the highest pain recently being an 8/10 in low back. Pt was able to complete 5 STS on elevated surface without use of UE while being on the edge of the chair. Performed the TUG and pt was able to complete it in 11 seconds which achieved the goal set for 6 visits. Gave pt tactile cueing during prone hip flexor stretch on hips to prevent hips from rotating. Pt reported sharp pain during lunges in both knees. Placed band around knees to prevent valgus collapse during mini squats. Pt tolerated the increases in reps and sets today well to improve strength and endurance. Ended session working on balancing on foam pad while doing functional UE movements. Pt did well with balance and reported fatigue in arms at the end of the exercise. Pt is compliant with at home exercises. Pt is continuing to progress towards goals and is tolerating new exercises well. [] No change. [] Other:  [x] Patient would continue to benefit from skilled physical therapy services in order to: to improve pain, strength, and activity tolerance to maintain current level of independence  Problems:    [x] ? Back/left hip Pain: 0-6/10                                      [x] ? ROM: decreased hip girdle flexibility                    [x] ? Strength:  Decreased hip girdle strength  [x] ?  Function: LEFI: 65% max function  [] Postural Deviations  [x] Gait Deviations: TU\", compensated Trend[place]burg gait   [] Other:                       STG: (to be met in 6 treatments)  1. ? Pain: Pt will report a max of 3-4/10 left hip and low back pain when sitting for periods of time, lying, standing in place, and walking in order to improve tolerance to sitting, sleep, and ADL completion. Ongoing 6/3  2. ? Strength: Pt will be able to complete 5 STS from an elevated surface without the use of UE support in order to demonstrate improved LE strength for functional transfers and mobility. MET 6/3  3. ? Function: Pt will be able to complete the TUG in less than or equal to 12\" in order to demonstrate an improvement in safe walking and transitions. MET 6/3 (11\")   4. Independent with Home Exercise Programs. MET 6/3  5. Demonstrate Knowledge of fall prevention- NOT NEEDED  LTG: (to be met in 12 treatments)  1. ? Pain:  Pt will report a max of 2-3/10 left hip and low back pain when sitting for periods of time, lying, standing in place, and walking in order to improve tolerance to sitting, sleep, and ADL completion. 2. ? ROM: Pt will be able to demonstrate AROM in lumbar spine an left hip in all planes with 0/10 left hip or low back pain in order to demonstrate an improvement in sitting and laying tolerance and mobility. 3. ? Strength: Pt will be able to complete at least 5 sit to stands from a standard height chair within 30 seconds without the use of her UEs in order to demonstrate an improvement in LE strength and tolerance to transfers. a. Pt will be able to demonstrate a 4+/5 grossly on hip girdle strength in order to promote pelvic stability and strength when standing and walking. 4. Gait: Pt will be able to complete a 6MWT with reports of mild increase in left hip pain in order to improve tolerance to walking for extended periods of time in the community. 5. ? Function: Pt will improve score on the LEFI to greater than or equal to 80% in order to demonstrate an improvement in funciton. Patient goals: \"Avoid back surgery\"       Pt.  Education:  [x] Yes  [] No  [x] Reviewed Prior HEP/Ed  Method of Education: [x] Verbal form  [] Demo squats  [] Written  Access Code: [email or web address]  Exercises  Supine Lower Trunk Rotation - 2-3 x daily - 10 reps  Hook Lying Single Knee to Chest Stretch with Towel - 1-3 x daily - 10 reps - 5\" hold  Modified Truman Stretch - 1 x daily - 3 sets - 20\" hold  Supine Bridge with Resistance Band - 1 x daily - 3 sets - 5 reps  Hooklying Clamshell with Resistance - 1 x daily - 2 sets - 10 reps  Seated Hamstring Stretch - 1 x daily - 3 sets - 20-30\" hold  Seated Hip Abduction with Resistance - 1 x daily - 2 sets - 10 reps  Comprehension of Education:  [x] Verbalizes understanding. [] Demonstrates understanding. [] Needs review. [] Demonstrates/verbalizes HEP/Ed previously given. Plan: [x] Continue current frequency toward long and short term goals. [x] Specific Instructions for subsequent treatments: update HEP, progress as tolerated, SLS balancing, and stair ascent/descent.        Time In: 10:00 am       Time Out: 11:01 am    Electronically signed by:  LAURA Rebolledo  This documentation has been reviewed and entirety of treatment session with direct supervision by clinical instructor, David Saunders, PT, DPT

## 2022-06-13 ENCOUNTER — HOSPITAL ENCOUNTER (OUTPATIENT)
Dept: PHYSICAL THERAPY | Facility: CLINIC | Age: 82
Setting detail: THERAPIES SERIES
Discharge: HOME | End: 2022-06-13
Payer: MEDICARE

## 2022-06-13 PROCEDURE — 97110 THERAPEUTIC EXERCISES: CPT

## 2022-06-13 NOTE — FLOWSHEET NOTE
[] [facility] [address]  [address]  [phone]  F: [phone] [x] [address]  [place] [address]   [address]  P: [phone]  F: [phone] [] [address]  [address]  P: [phone]  F: [phone] [] [address]  P: [phone]  F: [phone] [] [address]  [facility]   [address]   [place]: [phone]  F: [phone]      Physical Therapy Daily Treatment Note    Date:  2022  Patient Name:  Fito Robbins    :  1940  MRN: [id number]  Physician: Irma Pretty PA-C                              Insurance: Medicare/[place] (Medicare Guidelines)  Medical Diagnosis:   S60.135 (ICD-10-CM) - History of total hip replacement, bilateral   M70.62 (ICD-10-CM) - Greater trochanteric bursitis of left hip   M51.36 (ICD-10-CM) - DDD (degenerative disc disease), lumbar   Rehab Codes: M54.59, M79.652, M62.81  Onset Date: 22               Next 's appt. : 22  Visit# / total visits: ; Progress note for Medicare patient due at visit 12     Cancels/No Shows:     Subjective:    Pain:  [] Yes  [x] No Location: low back Pain Rating: (0-10 scale) 5/10  Pain altered Tx:  [x] No  [] Yes  Action:    Comments: pt states that she is noticing improvements since starting PT, however voices feeling discouraged that she still has LBP with tasks that seem simple such as sweeping her deck. Pt reports that currently she only has LBP, but continues with L hip pain as well, but it comes and goes. Pt feels greatest pain in the mornings, but has been trying to sleep with a pillow between her knees and thinks she needs more time trying this.  Pt also comments that she has a hard time with the HEP, but does not specify what it is she finds hard.      Objective:  Modalities:   Precautions:  Exercises: Access Code: [email or web address] (use this access code for all additional exercises)  bolded completed 06/13/22   Exercise Reps/ Time Weight/ Level Comments   Nustep 5 lv1          PRONE      Prone lay  2 min     Prone lay propped on elbows 2 min     Quad stretch 3x30ea     HS curls 3x10 ea 3lb ankle weight Added weight 5/20; self progressed reps 5/31         SUPINE      HS stretch 3x30 ea     Supine clamshells 2x12 Blue  Increased resistance 5/31  Increased reps 6/13         Bridges with hip adduction 2x12 Blue; ball Increased reps 6/3  Added 5/27   Unilateral hip abduction 2x12 ea Blue  Added 5/20  Increased reps 6/3         SIDELINE      Hip abduction 10 ea lime Increased resistance 5/27   Clamshells 2x10 ea blue Increased resistance 6/3  Increased sets 6/3         STANDING      STS off elevated mat 5x3  Added 5/23 1st set with hands, other sets without UE's    Stair ascent      Stair descent      Lateral side steps 2x30ft  ea lime Increased reps 5/27  Band at knees for 1 set & ankles 1 set   Monster walks 2x30ft lime Increased reps 5/27  Band above knees   Backwards monster walks 2x30ft lime Increased reps 5/27  Band above knees   Lat pull downs  2x15  purple    paloff press 10 each purple    Calf raises 2x15           3 way hip 10x ea lime Bilaterally          BALANCE      SLS  x  Able to balance on left for 8 sec  Able to balance on right for 7 sec   tandem 2x30\" ea     DLS NBOS balance 8H18\"  Foam  Eyes closed   High marches  20x2\"  Added 5/23; light UE's   DLS balance ball diagonals 20 each direction Foam  2.2# yellow ball Started top of left shoulder to bottom of right hip and vice versa; increased weight 5/23   lunges 10 ea  Parallel bars  Added 6/3   Mini squats  2x10 Blue  Band above knees  Increased sets 6/3   DLS balance on foam side to side rotations 10 each direction 4# medball    DLS balance on foam chest press 20x 4# medball Added   Increased weight    DLS balance ball tosses 30 tosses volleyball    DLS on foam  10 each 4lb ball  6lb ball    4lb ball 1st set: paloff press  2nd set: start at hips and bring up to chest  3rd set: start at chest and raise medball overhead         Other:  Specific Instructions for subsequent treatments: update HEP, progress as tolerated, SLS balancing, and stair ascent/descent. Treatment Charges: Mins Units   []  Modalities     [x]  Ther Exercise 40 3   []  Manual Therapy     []  Ther Activities     []  Aquatics     []  Vasocompression     []  Other     Total Treatment time 40 3   Estimated medicare Cost (as of 22 ): $713.60    Assessment: [] Progressing toward goals. [] No change. [x] Other: started session with warm up on Nustep followed by exercises laying down. Frequent cues given for core activation and explained rationale to pt for stabilizing the core. Pt took rest breaks as needed d/t fatigue and overall reports no increase in pain from treatment just fatigue. Decreased weight of medicine ball for exercises standing on foam for better form and tolerance. Had pt use lime looped tband for 3 way hip this date and added this to HEP. Updated HEP with to include standing exercises and clamshells. Also issued blue band for home. Pt states that she did not want to make further progressions to her treatment as she wanted to wait for her follow up appt with Gema Gonzales this Thursday. [x] Patient would continue to benefit from skilled physical therapy services in order to: to improve pain, strength, and activity tolerance to maintain current level of independence  Problems:    [x] ? Back/left hip Pain: 0-6/10                                      [x] ? ROM: decreased hip girdle flexibility                    [x] ? Strength:  Decreased hip girdle strength  [x] ?  Function: LEFI: 65% max function  [] Postural Deviations  [x] Gait Deviations: TU\", compensated [place] gait   [] Other:                       STG: (to be met in 6 treatments)  1. ? Pain: Pt will report a max of 3-4/10 left hip and low back pain when sitting for periods of time, lying, standing in place, and walking in order to improve tolerance to sitting, sleep, and ADL completion. Ongoing 6/3  2. ? Strength: Pt will be able to complete 5 STS from an elevated surface without the use of UE support in order to demonstrate improved LE strength for functional transfers and mobility. MET 6/3  3. ? Function: Pt will be able to complete the TUG in less than or equal to 12\" in order to demonstrate an improvement in safe walking and transitions. MET 6/3 (11\")   4. Independent with Home Exercise Programs. MET 6/3  5. Demonstrate Knowledge of fall prevention- NOT NEEDED  LTG: (to be met in 12 treatments)  1. ? Pain:  Pt will report a max of 2-3/10 left hip and low back pain when sitting for periods of time, lying, standing in place, and walking in order to improve tolerance to sitting, sleep, and ADL completion. 2. ? ROM: Pt will be able to demonstrate AROM in lumbar spine an left hip in all planes with 0/10 left hip or low back pain in order to demonstrate an improvement in sitting and laying tolerance and mobility. 3. ? Strength: Pt will be able to complete at least 5 sit to stands from a standard height chair within 30 seconds without the use of her UEs in order to demonstrate an improvement in LE strength and tolerance to transfers. a. Pt will be able to demonstrate a 4+/5 grossly on hip girdle strength in order to promote pelvic stability and strength when standing and walking. 4. Gait: Pt will be able to complete a 6MWT with reports of mild increase in left hip pain in order to improve tolerance to walking for extended periods of time in the community. 5. ? Function: Pt will improve score on the LEFI to greater than or equal to 80% in order to demonstrate an improvement in funciton. Patient goals:  \"Avoid back surgery\"       Pt. Education:  [x] Yes  [] No  [] Reviewed Prior HEP/Ed  Method of Education: [x] Verbal- encouraged compliance with HEP, even if she choose 3-5 exercises to do at a time   [x] Demo [x] Written- updated and issued appropriate tbands 6/13  Access Code: [email or web address]  URL: [email or web address]/  Date: 06/13/2022  Prepared by: Kathrin Longoria  Exercises  Supine Lower Trunk Rotation - 2-3 x daily - 10 reps  Hook Lying Single Knee to Chest Stretch with Towel - 1-3 x daily - 10 reps - 5\" hold  Modified Truman Stretch - 1 x daily - 3 sets - 20\" hold  Supine Bridge with Resistance Band - 1 x daily - 3 sets - 5 reps  Hooklying Clamshell with Resistance - 1 x daily - 2 sets - 10 reps  Seated Hamstring Stretch - 1 x daily - 3 sets - 20-30\" hold  Seated Hip Abduction with Resistance - 1 x daily - 2 sets - 10 reps  Hooklying Single Leg Bent Knee Fallouts with Resistance - 1 x daily - 7 x weekly - 2 sets - 12 reps  Clamshell with Resistance - 1 x daily - 7 x weekly - 2 sets - 12 reps  Heel rises with counter support - 1 x daily - 7 x weekly - 2 sets - 15 reps  Mini Squat with Counter Support - 1 x daily - 7 x weekly - 2 sets - 10 reps  Lunge with Counter Support - 1 x daily - 7 x weekly - 1-2 sets - 10 reps  Hip Abduction with Resistance Loop - 1 x daily - 7 x weekly - 10 reps  Hip Extension with Resistance Loop - 1 x daily - 7 x weekly - 10 reps  Standing Hip Flexion with Resistance Loop - 1 x daily - 7 x weekly - 10 reps    Comprehension of Education:  [x] Verbalizes understanding. [x] Demonstrates understanding. [x] Needs review as pt states she has been struggling with the exercises she already had been issued. [] Demonstrates/verbalizes HEP/Ed previously given. Plan: [x] Continue current frequency toward long and short term goals. [x] Specific Instructions for subsequent treatments: update HEP, progress as tolerated, SLS balancing, and stair ascent/descent.        Time In: 10:59am         Time Out: 11:44am     Electronically signed by:  Janell Bautista PTA

## 2022-06-15 ENCOUNTER — HOSPITAL ENCOUNTER (OUTPATIENT)
Dept: PHYSICAL THERAPY | Facility: CLINIC | Age: 82
Setting detail: THERAPIES SERIES
Discharge: HOME | End: 2022-06-15
Payer: MEDICARE

## 2022-06-15 NOTE — FLOWSHEET NOTE
[] [facility]) - [facility]  [facility]  [address]    [phone]  F: [phone]   [x] [address]  [facility]   [address]  P: [phone]  F: [phone]  [] [address] [facility]  [facility]  [address] [facility]  P: [phone]  F: [phone] [] [address]  P: [phone]  F: [phone]  [] [address]  [facility]   [address]   [place]: [phone]  F: [phone]   [] [facility]  [address] [facility] [address]  [place]: [phone]   F: [phone]     Physical Therapy Cancel/No Show note    Date: 6/15/2022  Patient: Yuliet Turner  : 1940  MRN: [id number]    Cancels/No Shows to date:     For today's appointment patient:    [x]  Cancelled- not counted against pt     [] Rescheduled appointment    [] No-show     Reason given by patient:    []  Patient ill    []  Conflicting appointment    [] No transportation      [] Conflict with work    [] No reason given    [] Weather related    [] COVID-19    [x] Other:     Comments:  Pt was here on time.  and therapist error for not getting pt back in time. Pt has to leave due to conflicting appointment. Next confirmed.        [x] Next appointment was confirmed    Electronically signed by: Alexis Denis PTA

## 2022-06-16 ENCOUNTER — OFFICE VISIT (OUTPATIENT)
Dept: ORTHOPEDIC SURGERY | Age: 82
End: 2022-06-16
Payer: MEDICARE

## 2022-06-16 VITALS
RESPIRATION RATE: 12 BRPM | BODY MASS INDEX: 26.4 KG/M2 | HEIGHT: 63 IN | WEIGHT: 149 LBS | HEART RATE: 56 BPM | DIASTOLIC BLOOD PRESSURE: 76 MMHG | SYSTOLIC BLOOD PRESSURE: 131 MMHG

## 2022-06-16 DIAGNOSIS — Z96.643 HISTORY OF TOTAL HIP REPLACEMENT, BILATERAL: ICD-10-CM

## 2022-06-16 DIAGNOSIS — M70.62 GREATER TROCHANTERIC BURSITIS OF LEFT HIP: ICD-10-CM

## 2022-06-16 DIAGNOSIS — M51.36 DDD (DEGENERATIVE DISC DISEASE), LUMBAR: Primary | ICD-10-CM

## 2022-06-16 PROCEDURE — G8417 CALC BMI ABV UP PARAM F/U: HCPCS | Performed by: PHYSICIAN ASSISTANT

## 2022-06-16 PROCEDURE — G8427 DOCREV CUR MEDS BY ELIG CLIN: HCPCS | Performed by: PHYSICIAN ASSISTANT

## 2022-06-16 PROCEDURE — 1090F PRES/ABSN URINE INCON ASSESS: CPT | Performed by: PHYSICIAN ASSISTANT

## 2022-06-16 PROCEDURE — G8400 PT W/DXA NO RESULTS DOC: HCPCS | Performed by: PHYSICIAN ASSISTANT

## 2022-06-16 PROCEDURE — 99213 OFFICE O/P EST LOW 20 MIN: CPT | Performed by: PHYSICIAN ASSISTANT

## 2022-06-16 PROCEDURE — 1036F TOBACCO NON-USER: CPT | Performed by: PHYSICIAN ASSISTANT

## 2022-06-16 PROCEDURE — 1123F ACP DISCUSS/DSCN MKR DOCD: CPT | Performed by: PHYSICIAN ASSISTANT

## 2022-06-16 ASSESSMENT — ENCOUNTER SYMPTOMS
VOMITING: 0
APNEA: 0
SHORTNESS OF BREATH: 0
NAUSEA: 0
COUGH: 0
DIARRHEA: 0
CHEST TIGHTNESS: 0
CONSTIPATION: 0
ABDOMINAL DISTENTION: 0
ABDOMINAL PAIN: 0
COLOR CHANGE: 0

## 2022-06-16 NOTE — PROGRESS NOTES
Medication Sig Dispense Refill    methylPREDNISolone (MEDROL DOSEPACK) 4 MG tablet Take by mouth. 1 kit 0    lipase-protease-amylase (CREON) 65770 units CPEP delayed release capsule Take 180,000 Units by mouth daily      atorvastatin (LIPITOR) 10 MG tablet Take 10 mg by mouth daily      cycloSPORINE (RESTASIS) 0.05 % ophthalmic emulsion Apply 1 drop to eye daily      ammonium lactate (AMLACTIN) 12 % cream Apply 12 each topically daily      Handicap Placard MISC 5 year handicap placard 1 each 0    cyclobenzaprine (FLEXERIL) 10 MG tablet 10 mg      gabapentin (NEURONTIN) 300 MG capsule 300 mg..      HYDROcodone-acetaminophen (NORCO) 5-325 MG per tablet 5-325 tablets. Wanda Alamin omeprazole (PRILOSEC) 20 MG delayed release capsule Take 20 mg by mouth daily       Current Facility-Administered Medications   Medication Dose Route Frequency Provider Last Rate Last Admin    lidocaine 1 % injection 4 mL  4 mL Intra-artICUlar Once Elida Bombard, DO        lidocaine 1 % injection 4 mL  4 mL Intra-artICUlar Once Elida Bombard, DO           Allergies:    Patient has no known allergies. Social History:   Social History     Socioeconomic History    Marital status:      Spouse name: None    Number of children: None    Years of education: None    Highest education level: None   Occupational History    None   Tobacco Use    Smoking status: Never Smoker    Smokeless tobacco: Never Used   Substance and Sexual Activity    Alcohol use: No    Drug use: No    Sexual activity: None   Other Topics Concern    None   Social History Narrative    None     Social Determinants of Health     Financial Resource Strain:     Difficulty of Paying Living Expenses: Not on file   Food Insecurity:     Worried About Running Out of Food in the Last Year: Not on file    Gisele of Food in the Last Year: Not on file   Transportation Needs:     Lack of Transportation (Medical):  Not on file    Lack of Transportation (Non-Medical): Not on file   Physical Activity:     Days of Exercise per Week: Not on file    Minutes of Exercise per Session: Not on file   Stress:     Feeling of Stress : Not on file   Social Connections:     Frequency of Communication with Friends and Family: Not on file    Frequency of Social Gatherings with Friends and Family: Not on file    Attends Spiritism Services: Not on file    Active Member of [address] [place] [address] or Organizations: Not on file    Attends Club or Organization Meetings: Not on file    Marital Status: Not on file   Intimate Partner Violence:     Fear of Current or Ex-Partner: Not on file    Emotionally Abused: Not on file    Physically Abused: Not on file    Sexually Abused: Not on file   Housing Stability:     Unable to Pay for Housing in the Last Year: Not on file    Number of [place] in the Last Year: Not on file    Unstable Housing in the Last Year: Not on file       Family History:  No family history on file. Vitals:   /76   Pulse 56   Resp 12   Ht 5' 3\" (1.6 m)   Wt 149 lb (67.6 kg)   BMI 26.39 kg/m²  Body mass index is 26.39 kg/m². Physical Examination:     Orthopedics:    GENERAL: Alert and oriented X3 in no acute distress. SKIN: Intact without lesions or ulcerations. NEURO: Musculoskeletal and axillary nerves intact to sensory and motor testing. VASC: Capillary refill is less than 3 seconds. LUMBAR SPINE    GAIT: The patient does stand with a normal spinal contour. The patient does not walk easily on toes and does not walk easily on heels. ROM: Lumbar spine reveals there is not restriction in forward flexion to fingertips at the level of ankle. There is obvious irritability when extending from the forward flexed position. There is not restriction in right side bending, There is restriction in left side bending, There is restriction in right rotation, There is restriction in left rotation.  There is pain in extension or is not pain in single leg extension. PALPATION: Paravertebral spasm is mildly present. Lumbosacral step off is not present. There is no SI joint pain to palpation. There is not central spine pain to palpation. Pain to palpation of the greater trochanter bursa worse on the left  SKIN: Intact without rashes, lesions or ulcerations. NEURO: Straight leg raising is negative. Neurologic exam reveals 2+/4 patellar reflexes, 2+/4 achilles reflexes, focal neurologic deficits are not noted. VASC: Cap refill less than 2 sec. PT/DP pulses are 2+/4, popliteal pulses are 2+/4, femoral pulses are 2+/4. MUSC: 5/5 plantar flexors, 5/5 dorsi flexors, 5/5 EHL, 5/5 abductors, 5/5 adductors, 5/5 knee extensors, 5/5 hip flexors. SPECIAL TESTS:  negative Castro's reflex, negative diadochokinesis test, negative Babinski, negative Clonus,  negative Lhermitte's sign. Ibeth's signs is not present. Assessment:     1. DDD (degenerative disc disease), lumbar    2. History of total hip replacement, bilateral    3. Greater trochanteric bursitis of left hip      Procedures:    Procedure: no  Radiology:   No results found. Plan:   Treatment : I reviewed the X-ray of the patient's hips and back with the patient. Over the lumbar x-rays with the patient since she had them on her way out at her last appointment. We discussed the etiologies and natural histories of degenerative disc disease of lumbar spine, greater trochanteric bursitis left hip, s/p bilateral GOPAL. We discussed the various treatment alternatives including anti-inflammatory medications, physical therapy, injections, further imaging studies and as a last result surgery. During today's visit, I did explain to her that it still seems like most of her pain is coming from her low back. I do think an MRI is the next step in treatment and a referral to pain management as they may be able to do some injections into her back that I do not do.  Patient is not interested in any kind of back surgery at this time. I do think that she should continue with her HEP and may discontinue going to therapy as long as she continues to work on the exercises at home to work on strength and ROM. I emphasized the importance of this to prevent falls as we do not want her legs to get weak . The patient has opted for MRI of lumbar spine and referral to pain management. A physical therapy prescription was not given. Patient should return to the clinic PRN to follow up with Juel Holstein PA-C. The patient will call the office immediately with any problems. No orders of the defined types were placed in this encounter. Orders Placed This Encounter   Procedures    MRI LUMBAR SPINE WO CONTRAST     Standing Status:   Future     Standing Expiration Date:   6/16/2023   [facility] [facility], [address] [place] [address], MD, Pain Management, Russ     Referral Priority:   Routine     Referral Type:   Eval and Treat     Referral Reason:   Specialty Services Required     Referred to Provider:   Tammie Workman MD     Requested Specialty:   Pain Management     Number of Visits Requested:   1       I, Hortencia Donohue MS, AT, ATC, am scribing for and in the presence of Juel Holstein PA-C. 6/16/2022  12:48 PM    Electronically signed by Gurpreet Conrad PA-C, on 6/16/2022 at 12:48 PM     I, Juel Holstein PA-C, have personally seen this patient, reviewed the chart including history, and imaging if done. I personally  performed the physical exam and obtained any needed additional history. I placed orders, performed or supervised procedures and developed the treatment plan.     Electronically signed by Gurpreet Conrad PA-C, on 6/16/2022 at 12:49 PM
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
## 2022-06-23 ENCOUNTER — APPOINTMENT (OUTPATIENT)
Dept: PHYSICAL THERAPY | Facility: CLINIC | Age: 82
End: 2022-06-23
Payer: MEDICARE

## 2022-07-08 ENCOUNTER — TELEPHONE (OUTPATIENT)
Dept: ORTHOPEDIC SURGERY | Age: 82
End: 2022-07-08

## 2022-07-08 ENCOUNTER — HOSPITAL ENCOUNTER (OUTPATIENT)
Dept: MRI IMAGING | Facility: CLINIC | Age: 82
Discharge: HOME | End: 2022-07-10
Payer: MEDICARE

## 2022-07-08 DIAGNOSIS — M51.36 DDD (DEGENERATIVE DISC DISEASE), LUMBAR: ICD-10-CM

## 2022-07-08 PROCEDURE — 72148 MRI LUMBAR SPINE W/O DYE: CPT

## 2022-07-08 NOTE — TELEPHONE ENCOUNTER
----- Message from Rhina Hall PA-C sent at 7/8/2022  2:09 PM EDT -----  Patient has significant arthritic changes in her lumbar spine with stenosis.   We can repeat her to Ortho spine or to pain management to discuss injections

## 2022-07-08 NOTE — TELEPHONE ENCOUNTER
Gave pt mri results. Advised to see pain management.  She already has appt with dr Lucien Aragon on 7/12/22

## 2022-07-12 ENCOUNTER — INITIAL CONSULT (OUTPATIENT)
Dept: PAIN MANAGEMENT | Age: 82
End: 2022-07-12
Payer: MEDICARE

## 2022-07-12 VITALS — HEIGHT: 63 IN | WEIGHT: 145 LBS | BODY MASS INDEX: 25.69 KG/M2

## 2022-07-12 DIAGNOSIS — M47.817 LUMBOSACRAL SPONDYLOSIS WITHOUT MYELOPATHY: Primary | ICD-10-CM

## 2022-07-12 DIAGNOSIS — M48.062 SPINAL STENOSIS OF LUMBAR REGION WITH NEUROGENIC CLAUDICATION: ICD-10-CM

## 2022-07-12 PROCEDURE — G8400 PT W/DXA NO RESULTS DOC: HCPCS | Performed by: ANESTHESIOLOGY

## 2022-07-12 PROCEDURE — 1036F TOBACCO NON-USER: CPT | Performed by: ANESTHESIOLOGY

## 2022-07-12 PROCEDURE — 1123F ACP DISCUSS/DSCN MKR DOCD: CPT | Performed by: ANESTHESIOLOGY

## 2022-07-12 PROCEDURE — G8427 DOCREV CUR MEDS BY ELIG CLIN: HCPCS | Performed by: ANESTHESIOLOGY

## 2022-07-12 PROCEDURE — G8417 CALC BMI ABV UP PARAM F/U: HCPCS | Performed by: ANESTHESIOLOGY

## 2022-07-12 PROCEDURE — 99205 OFFICE O/P NEW HI 60 MIN: CPT | Performed by: ANESTHESIOLOGY

## 2022-07-12 PROCEDURE — 1090F PRES/ABSN URINE INCON ASSESS: CPT | Performed by: ANESTHESIOLOGY

## 2022-07-12 RX ORDER — HYDROCODONE BITARTRATE AND ACETAMINOPHEN 5; 325 MG/1; MG/1
1 TABLET ORAL DAILY PRN
Qty: 14 TABLET | Refills: 0 | Status: SHIPPED | OUTPATIENT
Start: 2022-07-12 | End: 2022-07-26

## 2022-07-12 ASSESSMENT — ENCOUNTER SYMPTOMS
RESPIRATORY NEGATIVE: 1
EYES NEGATIVE: 1
ALLERGIC/IMMUNOLOGIC NEGATIVE: 1
GASTROINTESTINAL NEGATIVE: 1

## 2022-07-12 NOTE — PROGRESS NOTES
The patient is a 80 y. o. Non- / non  female. Chief Complaint   Patient presents with    Consultation    Back Pain        HPI    Requesting physician for the evaluation of Gayatri Speaker 1940:     Pain History    Is a very pleasant 59-year-old female with history of chronic back pain  Onset of symptom many years ago  Symptoms are progressively worsened over time  Identify location of pain in the lower lumbar area across midline affecting both side  Pain is constant aching nagging stiffness  Report morning stiffness  No previous lumbar spine injection history  No previous lumbar spine surgical history  Recently completed therapy with limited benefit  High risk for NSAID related side effect  Currently using muscle relaxant and Norco as needed basis  Pain is affecting quality of life and activity level  Denies any loss of bladder or bowel control  No history of fever chills or weight loss  No history of progressive leg weakness    She also reports radiation of pain down both legs that comes with standing and walking alleviates with sitting and leaning forward    Patient have a recent MRI lumbar spine here for review of the imaging and discuss treatment option    Pain score today  6  1. Location: back  2. Radiation: both legs  3. Character:   5. Duration:   6. Onset: years  7. Did an injury cause pain: no  8. Aggravating factors: sitting, bending  9. Alleviating factors: nothing  10. Associated symptoms (numbness / tingling / weakness):  yes  -Where at: legs  -Down into finger tips or toes (specify which finger or toes):   -constant or intermitting:  intermitting  11. Red Flags: (weight loss / chills / loss of bladder or bowel control): none    Previous management history  1. Previous diagnostic workup: (Imaging/EMG)   CT, MRI, or Xray:  MRI & Xray  What part of the body: back  [facility]  What year or specific date: 2022  EMG:  no    2.  Previous non interventional treatments tried:  chiropractor or physical therapy:  PT  What part of the body: back  What facility was it done at:  [facility]  How long ago was it last tried:weeks  Did it work: Yes  Did they complete it:Yes    3. Previous Medications tried  NSAID's: yes  Neurontin: yes  Lyrica: no  Trycyclic antidepressant (Ellavil / Pamelor ): no  Cymbalta: no  Opioids (Ultram / Vicodin / Percocet / Morphine / Dilaudid / Oramorph/ Fentanyl etc.): Norco  Last Pain medication taken (name of med and date): few days ago    4. Previous Interventional pain procedures tried:  What kind of injection:n/a  Who did the injection:  n/a  did the injection help: n/a  Last time injection was done:N/A    5.  Previous surgeries for pain  What part of the body did they have the surgery: n/a  What physician did the surgery: [facility] did they have the surgery done: n/a  Date of Surgery:N/A    Social History:  Marital status: single  Employment History:   Working  No  Full time Or Part time:   Disability  No   Legal Issues related to pain complaint: No     Pain Disability Index score : 40    No results found for: LABA1C  No results found for: EAG      Informant: patient        Past Medical History:   Diagnosis Date    Acid reflux         Past Surgical History:   Procedure Laterality Date    CATARACT REMOVAL      GALEN    HIP SURGERY      galen GOPAL       Social History     Socioeconomic History    Marital status:      Spouse name: None    Number of children: None    Years of education: None    Highest education level: None   Occupational History    None   Tobacco Use    Smoking status: Never Smoker    Smokeless tobacco: Never Used   Substance and Sexual Activity    Alcohol use: No    Drug use: No    Sexual activity: None   Other Topics Concern    None   Social History Narrative    None     Social Determinants of Health     Financial Resource Strain:     Difficulty of Paying Living Expenses: Not on file   Food Insecurity:  Worried About Running Out of Food in the Last Year: Not on file    Gisele of Food in the Last Year: Not on file   Transportation Needs:     Lack of Transportation (Medical): Not on file    Lack of Transportation (Non-Medical): Not on file   Physical Activity:     Days of Exercise per Week: Not on file    Minutes of Exercise per Session: Not on file   Stress:     Feeling of Stress : Not on file   Social Connections:     Frequency of Communication with Friends and Family: Not on file    Frequency of Social Gatherings with Friends and Family: Not on file    Attends Oriental orthodox Services: Not on file    Active Member of [address] [place] [address] [employer] or Organizations: Not on file    Attends Club or Organization Meetings: Not on file    Marital Status: Not on file   Intimate Partner Violence:     Fear of Current or Ex-Partner: Not on file    Emotionally Abused: Not on file    Physically Abused: Not on file    Sexually Abused: Not on file   Housing Stability:     Unable to Pay for Housing in the Last Year: Not on file    Number of [place] in the Last Year: Not on file    Unstable Housing in the Last Year: Not on file       History reviewed. No pertinent family history. No Known Allergies    There were no vitals filed for this visit. Current Outpatient Medications   Medication Sig Dispense Refill    HYDROcodone-acetaminophen (NORCO) 5-325 MG per tablet Take 1 tablet by mouth daily as needed for Pain for up to 14 days. 14 tablet 0    methylPREDNISolone (MEDROL DOSEPACK) 4 MG tablet Take by mouth.  1 kit 0    lipase-protease-amylase (CREON) 99240 units CPEP delayed release capsule Take 180,000 Units by mouth daily      atorvastatin (LIPITOR) 10 MG tablet Take 10 mg by mouth daily      cycloSPORINE (RESTASIS) 0.05 % ophthalmic emulsion Apply 1 drop to eye daily      ammonium lactate (AMLACTIN) 12 % cream Apply 12 each topically daily      Handicap Placard MISC 5 year handicap placard 1 each 0    cyclobenzaprine (FLEXERIL) 10 MG tablet 10 mg      gabapentin (NEURONTIN) 300 MG capsule 300 mg..      omeprazole (PRILOSEC) 20 MG delayed release capsule Take 20 mg by mouth daily       Current Facility-Administered Medications   Medication Dose Route Frequency Provider Last Rate Last Admin    lidocaine 1 % injection 4 mL  4 mL Intra-artICUlar Once [place], DO        lidocaine 1 % injection 4 mL  4 mL Intra-artICUlar Once [place], DO           Review of Systems   Constitutional: Negative. Negative for fever. HENT: Negative. Eyes: Negative. Respiratory: Negative. Cardiovascular: Negative. Gastrointestinal: Negative. Endocrine: Negative. Genitourinary: Negative. Musculoskeletal: Negative. Skin: Negative. Allergic/Immunologic: Negative. Neurological: Positive for weakness. Hematological: Negative. Psychiatric/Behavioral: Negative. Objective:  General Appearance:  Uncomfortable, in pain, well-appearing and in no acute distress. Vital signs: (most recent): Height 5' 3\" (1.6 m), weight 145 lb (65.8 kg), not currently breastfeeding. Vital signs are normal.  No fever. Output: Producing urine and producing stool. HEENT: Normal HEENT exam.    Lungs:  Normal effort and normal respiratory rate. She is not in respiratory distress. Heart: Normal rate. Extremities: Normal range of motion. There is no deformity. Neurological: Patient is alert and oriented to person, place and time. Normal strength. Patient has normal muscle tone and normal coordination. Pupils:  Pupils are equal, round, and reactive to light. Pupils are equal.   Skin:  Warm and dry. No rash or cyanosis.    Lumbar spine examination  No apparent deformity and inspection  Range of motion is limited and associated with pain  Positive tenderness to palpation in bilateral lower lumbar paraspinal muscle and facet joint  Facet loading maneuver positive  Gait stable    Assessment & Plan     Is a very pleasant 70-year-old female with history of chronic back pain  Onset of symptom many years ago  Symptoms are progressively worsened over time  Identify location of pain in the lower lumbar area across midline affecting both side  Pain is constant aching nagging stiffness  Report morning stiffness  No previous lumbar spine injection history  No previous lumbar spine surgical history  Recently completed therapy with limited benefit  High risk for NSAID related side effect  Currently using muscle relaxant and Norco as needed basis  Pain is affecting quality of life and activity level  Denies any loss of bladder or bowel control  No history of fever chills or weight loss  No history of progressive leg weakness    She also reports radiation of pain down both legs that comes with standing and walking alleviates with sitting and leaning forward    Patient have a recent MRI lumbar spine here for review of the imaging and discuss treatment option    Pain score today  6    Physical Therapy Discharge Note  Date: 2022                                                Patient: Luli Alcazar                 : 1940                      MRN: [id number]                         Physician: Sujit Phillips PA-C                              Insurance: Medicare/[place] (Medicare Guidelines)  Medical Diagnosis:   R52.540 (ICD-10-CM) - History of total hip replacement, bilateral   M70.62 (ICD-10-CM) - Greater trochanteric bursitis of left hip   M51.36 (ICD-10-CM) - DDD (degenerative disc disease), lumbar   Rehab Codes: M54.59, M79.652, M62.81  Onset Date: 22               Next 's appt. : 22  Visit# / total visits: ; Progress note for Medicare patient due at visit 12                                    Cancels/No Shows:      Date of initial visit: 22                Date of final visit: 22    EXAMINATION: MRI OF [facility] WITHOUT CONTRAST, 2022 9:02 am    L2-L3: There is a circumferential disc bulge with facet and ligamentous hypertrophy. There is canal stenosis measuring 6 mm in AP dimension. There is mild left foraminal narrowing and no significant right foraminal narrowing. L3-L4: There is a circumferential disc bulge with facet and ligamentous hypertrophy. There is canal stenosis measuring 5 mm in AP dimension. There is mild left foraminal narrowing and no significant right foraminal narrowing. L4-L5: There is a circumferential disc bulge with facet and ligamentous hypertrophy. There is canal stenosis measuring 6 mm in AP dimension. There is mild bilateral foraminal narrowing. L5-S1: There is a circumferential disc bulge with facet hypertrophy. There is no canal stenosis or significant foraminal narrowing             1. Lumbosacral spondylosis without myelopathy    2.  Spinal stenosis of lumbar region with neurogenic claudication        MRI lumbar spine  Report is reviewed  Images reviewed independently  Finding discussed in detail with patient  Moderate to to severe spinal stenosis at L2-L3 L3-L4 and L4-L5 level  Multilevel severe lumbar facet arthropathy particularly at L3-4 and L4-5 level    Her main issue is axial lower back pain  I believe it is facet agenic based on imaging clinical examination and history    I would recommend for diagnostic lumbar medial branch nerve block  If this provide good short-term relief then we will proceed with confirmatory block and radiofrequency ablation    For neurogenic claudication symptoms are likely related to spinal stenosis  May consider for epidural injection or mild procedure  Information material provided    She is requesting a refill on Norco  Reports no side effect  Find it helpful  Denies any illicit substance use    Consultation note sent to the referring physician      Orders Placed This Encounter   Procedures    MS INJ DX/THER AGNT PARAVERT FACET JOINT, LUMBAR/SAC, 1ST LEVEL      Orders Placed This Encounter   Medications    HYDROcodone-acetaminophen (NORCO) 5-325 MG per tablet     Sig: Take 1 tablet by mouth daily as needed for Pain for up to 14 days. Dispense:  14 tablet     Refill:  0     Reduce doses taken as pain becomes manageable      Controlled Substance Monitoring:    Acute and Chronic Pain Monitoring:   RX Monitoring 7/12/2022   Periodic Controlled Substance Monitoring Possible medication side effects, risk of tolerance/dependence & alternative treatments discussed. ;Assessed functional status.                Electronically signed by Belle Sun MD on 7/12/2022 at 12:27 PM

## 2022-07-27 ENCOUNTER — HOSPITAL ENCOUNTER (OUTPATIENT)
Dept: PAIN MANAGEMENT | Facility: CLINIC | Age: 82
Discharge: HOME | End: 2022-07-27
Payer: MEDICARE

## 2022-07-27 VITALS
SYSTOLIC BLOOD PRESSURE: 141 MMHG | HEART RATE: 59 BPM | WEIGHT: 145 LBS | DIASTOLIC BLOOD PRESSURE: 75 MMHG | TEMPERATURE: 97.5 F | RESPIRATION RATE: 14 BRPM | HEIGHT: 63 IN | BODY MASS INDEX: 25.69 KG/M2 | OXYGEN SATURATION: 99 %

## 2022-07-27 DIAGNOSIS — R52 PAIN MANAGEMENT: ICD-10-CM

## 2022-07-27 DIAGNOSIS — M47.817 LUMBOSACRAL SPONDYLOSIS WITHOUT MYELOPATHY: Primary | ICD-10-CM

## 2022-07-27 PROCEDURE — 64493 INJ PARAVERT F JNT L/S 1 LEV: CPT | Performed by: ANESTHESIOLOGY

## 2022-07-27 PROCEDURE — 64494 INJ PARAVERT F JNT L/S 2 LEV: CPT | Performed by: ANESTHESIOLOGY

## 2022-07-27 PROCEDURE — 64495 INJ PARAVERT F JNT L/S 3 LEV: CPT

## 2022-07-27 PROCEDURE — 6360000004 HC RX CONTRAST MEDICATION: Performed by: ANESTHESIOLOGY

## 2022-07-27 PROCEDURE — 64493 INJ PARAVERT F JNT L/S 1 LEV: CPT

## 2022-07-27 PROCEDURE — 99152 MOD SED SAME PHYS/QHP 5/>YRS: CPT | Performed by: ANESTHESIOLOGY

## 2022-07-27 PROCEDURE — 6360000002 HC RX W HCPCS: Performed by: ANESTHESIOLOGY

## 2022-07-27 PROCEDURE — 64494 INJ PARAVERT F JNT L/S 2 LEV: CPT

## 2022-07-27 PROCEDURE — 2500000003 HC RX 250 WO HCPCS: Performed by: ANESTHESIOLOGY

## 2022-07-27 RX ORDER — FENTANYL CITRATE 50 UG/ML
INJECTION, SOLUTION INTRAMUSCULAR; INTRAVENOUS
Status: COMPLETED | OUTPATIENT
Start: 2022-07-27 | End: 2022-07-27

## 2022-07-27 RX ORDER — MIDAZOLAM HYDROCHLORIDE 2 MG/2ML
INJECTION, SOLUTION INTRAMUSCULAR; INTRAVENOUS
Status: COMPLETED | OUTPATIENT
Start: 2022-07-27 | End: 2022-07-27

## 2022-07-27 RX ORDER — BUPIVACAINE HYDROCHLORIDE 5 MG/ML
INJECTION, SOLUTION EPIDURAL; INTRACAUDAL
Status: COMPLETED | OUTPATIENT
Start: 2022-07-27 | End: 2022-07-27

## 2022-07-27 RX ORDER — LIDOCAINE HYDROCHLORIDE 10 MG/ML
INJECTION, SOLUTION EPIDURAL; INFILTRATION; INTRACAUDAL; PERINEURAL
Status: COMPLETED | OUTPATIENT
Start: 2022-07-27 | End: 2022-07-27

## 2022-07-27 RX ADMIN — LIDOCAINE HYDROCHLORIDE 5 ML: 10 INJECTION, SOLUTION EPIDURAL; INFILTRATION; INTRACAUDAL at 12:09

## 2022-07-27 RX ADMIN — MIDAZOLAM HYDROCHLORIDE 1 MG: 1 INJECTION, SOLUTION INTRAMUSCULAR; INTRAVENOUS at 12:08

## 2022-07-27 RX ADMIN — BUPIVACAINE HYDROCHLORIDE 4 ML: 5 INJECTION, SOLUTION EPIDURAL; INTRACAUDAL; PERINEURAL at 12:09

## 2022-07-27 RX ADMIN — IOPAMIDOL 3 ML: 408 INJECTION, SOLUTION INTRATHECAL at 12:11

## 2022-07-27 RX ADMIN — FENTANYL CITRATE 50 MCG: 50 INJECTION INTRAMUSCULAR; INTRAVENOUS at 12:08

## 2022-07-27 ASSESSMENT — PAIN - FUNCTIONAL ASSESSMENT
PAIN_FUNCTIONAL_ASSESSMENT: 0-10
PAIN_FUNCTIONAL_ASSESSMENT: PREVENTS OR INTERFERES SOME ACTIVE ACTIVITIES AND ADLS

## 2022-07-27 ASSESSMENT — PAIN DESCRIPTION - DESCRIPTORS: DESCRIPTORS: ACHING

## 2022-07-27 NOTE — DISCHARGE INSTRUCTIONS
Discharge Instructions following Sedation or Anesthesia:  You have  received  a sedative/anesthetic therefore, you should not consume any alcoholic beverages for minimum of 12 hours. Do not drive or operate machinery for 24 hours. Do not sign legal documents for 24 hours. Dizziness, drowsiness, and unsteadiness may occur. Rest when need to. Increase diet as tolerated. Keep up on fluids if diet allows. General Instructions:  Do not take a tub bath for 72 hours after procedure (this includes hot tubs and swimming pools). You may shower, but avoid hot water to injection site. Avoid strenuous activity TODAY especially if you experience dizziness. Remove band-aid the next day. Wash off any residual iodine   Do not use heat, heating pad, or any other heating device over the injection site for 3 days after the procedure. If you experience pain after your procedure, you may continue with your current pain medication as prescribed. (DO NOT INCREASE YOUR PAIN MEDICATION WITHOUT TALKING TO DOCTOR)  Soreness and pain at injection site is common, may use ice to reduce soreness. Please complete pain diary as instructed.      Call Tania Morris at [phone] if you experience:   Fever, chills or temperature over 100    Vomiting, Headache, persistent stiff neck, nausea, blurred vision   Difficulty in urinating or unable to urinate with 8 hours   Increase in weakness, numbness or loss of function   Increased redness, swelling or drainage at the injection site

## 2022-07-27 NOTE — H&P
UPDATE:  Office visit pain clinic in [facility] with all required elements of H&P dated 07/12/2022  Patient seen preop, chart reviewed,   No changes in medical history and health assessment since last evaluation. PE:  AAO x 3, in NAD, VSS,. Resp: breathing on RA, no respiratory distress  Cardiac: rate normal    Risk / Benefits explained to patient, patient agree to proceed with plan.   ASA 2  MP 2

## 2022-08-02 ENCOUNTER — OFFICE VISIT (OUTPATIENT)
Dept: PAIN MANAGEMENT | Age: 82
End: 2022-08-02
Payer: MEDICARE

## 2022-08-02 VITALS — HEIGHT: 63 IN | BODY MASS INDEX: 25.69 KG/M2 | WEIGHT: 145 LBS

## 2022-08-02 DIAGNOSIS — M47.817 LUMBOSACRAL SPONDYLOSIS WITHOUT MYELOPATHY: ICD-10-CM

## 2022-08-02 DIAGNOSIS — M48.062 SPINAL STENOSIS OF LUMBAR REGION WITH NEUROGENIC CLAUDICATION: Primary | ICD-10-CM

## 2022-08-02 PROCEDURE — 1123F ACP DISCUSS/DSCN MKR DOCD: CPT | Performed by: ANESTHESIOLOGY

## 2022-08-02 PROCEDURE — G8417 CALC BMI ABV UP PARAM F/U: HCPCS | Performed by: ANESTHESIOLOGY

## 2022-08-02 PROCEDURE — G8400 PT W/DXA NO RESULTS DOC: HCPCS | Performed by: ANESTHESIOLOGY

## 2022-08-02 PROCEDURE — G8427 DOCREV CUR MEDS BY ELIG CLIN: HCPCS | Performed by: ANESTHESIOLOGY

## 2022-08-02 PROCEDURE — 1036F TOBACCO NON-USER: CPT | Performed by: ANESTHESIOLOGY

## 2022-08-02 PROCEDURE — 1090F PRES/ABSN URINE INCON ASSESS: CPT | Performed by: ANESTHESIOLOGY

## 2022-08-02 PROCEDURE — 99214 OFFICE O/P EST MOD 30 MIN: CPT | Performed by: ANESTHESIOLOGY

## 2022-08-02 ASSESSMENT — ENCOUNTER SYMPTOMS
RESPIRATORY NEGATIVE: 1
BACK PAIN: 1
EYES NEGATIVE: 1

## 2022-08-02 NOTE — PROGRESS NOTES
The patient is a 80 y. o. Non- / non  female. Chief Complaint   Patient presents with    Back Pain    Follow Up After Procedure     MBB        HPI    -80-year-old female with history of chronic low back pain  Onset of symptom many years ago  Pain located in the lower lumbar area  Completed physical therapy with limited benefit  Systemic NSAID therapy contraindicated because of renal risk associated with advanced age  Had recent MRI lumbar spine that showed multilevel lumbar facet arthropathy and severe lumbar spinal stenosis  Patient have no loss of bladder bowel control or focal deficit  She is not interested in any aggressive major spine surgeries  She is a high risk for surgery considering advanced age  Report numbness and heaviness in legs that comes with standing and walking and alleviates with rest and  Pain limits her ability to ambulate    For axial back pain we did a lumbar diagnostic medial branch nerve block that provided near complete relief of axial back pain    For claudication symptoms have suggested minimal invasive lumbar decompression using mild procedure  Procedures discussed at length with patient  She is very interested and eager to proceed      S/P: MBB      Outcome   Any improvement of activity?   Yes   Any side effects (appetite,leg cramping,facial fleshing): none   Increase of pain:  No  Pain score Today:  0  % of pain relief: 95%  Pain diary (medial branch block): Yes    No results found for: LABA1C  No results found for: EAG      Past Medical History:   Diagnosis Date    Acid reflux         Past Surgical History:   Procedure Laterality Date    CATARACT REMOVAL      GALEN    HIP SURGERY      galen GOPAL       Social History     Socioeconomic History    Marital status:      Spouse name: None    Number of children: None    Years of education: None    Highest education level: None   Tobacco Use    Smoking status: Never    Smokeless tobacco: Never   Substance and Sexual person, place and time. Patient has normal coordination. Pupils:  Pupils are equal, round, and reactive to light. Pupils are equal.   Skin:  Warm and dry. No rash or cyanosis. Assessment & Plan    -26-year-old female with history of chronic low back pain  Onset of symptom many years ago  Pain located in the lower lumbar area  Completed physical therapy with limited benefit  Systemic NSAID therapy contraindicated because of renal risk associated with advanced age  Had recent MRI lumbar spine that showed multilevel lumbar facet arthropathy and severe lumbar spinal stenosis  Patient have no loss of bladder bowel control or focal deficit  She is not interested in any aggressive major spine surgeries  She is a high risk for surgery considering advanced age  Report numbness and heaviness in legs that comes with standing and walking and alleviates with rest and  Pain limits her ability to ambulate    For axial back pain we did a lumbar diagnostic medial branch nerve block that provided near complete relief of axial back pain    For claudication symptoms have suggested minimal invasive lumbar decompression using mild procedure  Procedures discussed at length with patient  She is very interested and eager to proceed    EXAMINATION: MRI OF THE LUMBAR SPINE WITHOUT CONTRAST, 7/8/2022 9:02 am      L2-L3: There is a circumferential disc bulge with facet and ligamentous hypertrophy. There is canal stenosis measuring 6 mm in AP dimension. There is mild left foraminal narrowing and no significant right foraminal narrowing. L3-L4: There is a circumferential disc bulge with facet and ligamentous hypertrophy. There is canal stenosis measuring 5 mm in AP dimension. There is mild left foraminal narrowing and no significant right foraminal narrowing. L4-L5: There is a circumferential disc bulge with facet and ligamentous hypertrophy. There is canal stenosis measuring 6 mm in AP dimension.   There is mild bilateral

## 2022-08-15 ENCOUNTER — TELEPHONE (OUTPATIENT)
Dept: PAIN MANAGEMENT | Age: 82
End: 2022-08-15

## 2022-08-15 DIAGNOSIS — M48.062 SPINAL STENOSIS OF LUMBAR REGION WITH NEUROGENIC CLAUDICATION: Primary | ICD-10-CM

## 2022-08-15 NOTE — TELEPHONE ENCOUNTER
----- Message from Rachel Ventura MD sent at 8/12/2022 12:41 PM EDT -----  Please schedule for MILD procedure at [facility] 08/24 Wednesday at 130 pm  thanks

## 2022-08-17 ENCOUNTER — HOSPITAL ENCOUNTER (OUTPATIENT)
Dept: PREADMISSION TESTING | Age: 82
Discharge: HOME | End: 2022-08-21
Payer: MEDICARE

## 2022-08-17 VITALS
RESPIRATION RATE: 16 BRPM | WEIGHT: 146.8 LBS | HEIGHT: 63 IN | OXYGEN SATURATION: 100 % | BODY MASS INDEX: 26.01 KG/M2 | TEMPERATURE: 97.1 F | DIASTOLIC BLOOD PRESSURE: 59 MMHG | HEART RATE: 64 BPM | SYSTOLIC BLOOD PRESSURE: 127 MMHG

## 2022-08-17 LAB
ANION GAP SERPL CALCULATED.3IONS-SCNC: 6 MMOL/L (ref 9–17)
BUN BLDV-MCNC: 16 MG/DL (ref 8–23)
CHLORIDE BLD-SCNC: 107 MMOL/L (ref 98–107)
CO2: 28 MMOL/L (ref 20–31)
CREAT SERPL-MCNC: 0.79 MG/DL (ref 0.5–0.9)
GFR AFRICAN AMERICAN: >60 ML/MIN
GFR NON-AFRICAN AMERICAN: >60 ML/MIN
GFR SERPL CREATININE-BSD FRML MDRD: NORMAL ML/MIN/{1.73_M2}
HCT VFR BLD CALC: 32.3 % (ref 36.3–47.1)
HEMOGLOBIN: 9.8 G/DL (ref 11.9–15.1)
MCH RBC QN AUTO: 27.1 PG (ref 25.2–33.5)
MCHC RBC AUTO-ENTMCNC: 30.3 G/DL (ref 28.4–34.8)
MCV RBC AUTO: 89.2 FL (ref 82.6–102.9)
NRBC AUTOMATED: 0 PER 100 WBC
PDW BLD-RTO: 13.6 % (ref 11.8–14.4)
PLATELET # BLD: 141 K/UL (ref 138–453)
PMV BLD AUTO: 9.9 FL (ref 8.1–13.5)
POTASSIUM SERPL-SCNC: 3.7 MMOL/L (ref 3.7–5.3)
RBC # BLD: 3.62 M/UL (ref 3.95–5.11)
SODIUM BLD-SCNC: 141 MMOL/L (ref 135–144)
WBC # BLD: 6.7 K/UL (ref 3.5–11.3)

## 2022-08-17 PROCEDURE — 84520 ASSAY OF UREA NITROGEN: CPT

## 2022-08-17 PROCEDURE — 36415 COLL VENOUS BLD VENIPUNCTURE: CPT

## 2022-08-17 PROCEDURE — 82565 ASSAY OF CREATININE: CPT

## 2022-08-17 PROCEDURE — 93005 ELECTROCARDIOGRAM TRACING: CPT | Performed by: ANESTHESIOLOGY

## 2022-08-17 PROCEDURE — 85027 COMPLETE CBC AUTOMATED: CPT

## 2022-08-17 PROCEDURE — 80051 ELECTROLYTE PANEL: CPT

## 2022-08-17 RX ORDER — VITAMIN B COMPLEX
1 CAPSULE ORAL DAILY
COMMUNITY

## 2022-08-17 RX ORDER — ALENDRONATE SODIUM 70 MG/1
70 TABLET ORAL
COMMUNITY

## 2022-08-17 RX ORDER — ASCORBIC ACID 500 MG
1000 TABLET ORAL DAILY
COMMUNITY

## 2022-08-17 RX ORDER — ALBUTEROL SULFATE 90 UG/1
2 AEROSOL, METERED RESPIRATORY (INHALATION) EVERY 6 HOURS PRN
COMMUNITY

## 2022-08-17 ASSESSMENT — PAIN DESCRIPTION - LOCATION: LOCATION: BACK

## 2022-08-17 ASSESSMENT — PAIN SCALES - GENERAL: PAINLEVEL_OUTOF10: 0

## 2022-08-17 NOTE — PRE-PROCEDURE INSTRUCTIONS
[facility] ON Wednesday, 8/24/22 at 12:00 PM    Once you enter the hospital lobby, take the elevators to the second floor. Check-In is at the surgery registration desk. If you have been given a blood band, you must bring it with you the day of surgery. Continue to take your home medications as you normally do up to and including the night before surgery with the exception of any blood thinning medications. Please stop any blood thinning medications as directed by your surgeon or prescribing physician. Failure to stop certain medications may interfere with your scheduled surgery. These may include:  Aspirin, Warfarin (Coumadin), Clopidogrel (Plavix), Ibuprofen (Motrin, Advil), Naproxen (Aleve), Meloxicam (Mobic), Celecoxib (Celebrex), Eliquis, Pradaxa, Xarelto, Effient, Fish Oil, Herbal supplements. May take Tylenol for discomfort. If you are diabetic, do not take any of your diabetic medications by mouth the morning of surgery. If you are taking insulin contact the doctor that manages your diabetes for instructions about any changes to your insulin dosages the day before surgery. Do not inject insulin or other injectable diabetic medications the morning of surgery unless otherwise instructed by the doctor who manages your diabetes. Please take the following medication(s) the day of surgery with a small sip of water:  Omeprazole, Albuterol inhaler and Restasis    Please use your inhalers at home the day of surgery. PREPARING FOR YOUR SURGERY:     Before surgery, you can play an important role in your own health. Because skin is not sterile, we need to be sure that your skin is as free of germs as possible before surgery by carefully washing before surgery. Preparing or prepping skin before surgery can reduce the risk of a surgical site infection.   Do not shave the area of your body where your surgery will be performed unless you received specific permission from your physician. You will need to shower at home the night before surgery and the morning of surgery with a special soap called chlorhexidine gluconate (CHG*). *Not to be used by people allergic to Chlorhexidine Gluconate (CHG). Following these instructions will help you be sure that your skin is clean before surgery. Instructions on cleaning your skin before surgery: The night before your surgery: You will need to shower with warm water (not hot) and the CHG soap. Use a clean wash cloth and a clean towel. Have clean clothes available to put on after the shower. First wash your hair with regular shampoo. Rinse your hair and body thoroughly to remove the shampoo. Wash your face and genital area (private parts) with your regular soap or water only. Thoroughly rinse your body with warm water from the neck down. Turn water off to prevent rinsing the soap off too soon. With a clean wet washcloth and half of the CHG soap in the bottle, lather your entire body from the neck down. Do not use CHG soap near your eyes or ears to avoid injury to those areas. Wash thoroughly, paying special attention to the area where your surgery will be performed. Wash your body gently for five (5) minutes. Avoid scrubbing your skin too hard. Turn the water back on and rinse your body thoroughly. Pat yourself dry with a clean, soft towel. Do not apply lotion, cream or powder. Dress with clean freshly washed clothes. The morning of surgery:    Repeat shower following steps above - using remaining half of CHG soap in bottle. Patient Instructions: If you are having any type of anesthesia you are to have nothing to eat or drink after midnight the night before your surgery. This includes gum, hard candy, mints, water or smoking or chewing tobacco.  The only exception to this is a small sip of water to take with any morning dose of heart, blood pressure, or seizure medications.   No alcoholic beverages for 24 hours prior to surgery. Brush your teeth but do not swallow water. Bring your eyeglasses and case with you. No contacts are to be worn the day of surgery. You also may bring your hearing aids. If you are on C-PAP or Bi-PAP at home and plan on staying in the hospital overnight for your surgery please bring the machine with you. Do not wear any jewelry or body piercings day of surgery. Also, NO lotion, perfume or deodorant to be used the day of surgery. No nail polish on the operative extremity (arm/leg surgeries)     If you are staying overnight with us, please bring a small bag of personal items. Please wear loose, comfortable clothing. If you are potentially going to have a cast or brace bring clothing that will fit over them. In case of illness - If you have cold or flu like symptoms (high fever, runny nose, sore throat, cough, etc.) rash, nausea, vomiting, loose stools, and/or recent contact with someone who has a contagious disease (chicken pox, measles, etc.) Please call your doctor before coming to the hospital.    If your child is having surgery please make arrangements for any other children to be cared for at home on the day of surgery. Other children are not permitted in recovery room and we want you to be able to spend time with the patient. If other arrangements are not available then we suggest that you have a second adult to stay in the waiting room. Day of Surgery/Procedure:    As a patient at [place] [address] you can expect quality medical and nursing care that is centered on your individual needs. Our goal is to make your surgical experience as comfortable as possible    . Transportation After Your Surgery/Procedure: You will need a friend or family member to drive you home after your procedure.   Your  must be 18

## 2022-08-17 NOTE — PROGRESS NOTES
PAT Progress Note    Pt Name: Jesus Mosqueda  MRN: [id number]  YOB: 1940  Date of evaluation: 8/17/2022      [x] Called to [facility]. I spoke to the patient, Jesus Mosqueda, a 80 y.o. healthy active female, who arrived to [facility] for testing prior to the upcoming [address] AT L3 / [facility] 116 L4-L5 BY Yunier Wild MD for [address] [facility] scheduled 8/24/2022 @ 1330. [x] I reviewed in epic the Pain Management Note by Dr Dany Ivey dated 8/2/22 for an Interval History and Physical Note the day of surgery. Vital signs: BP (!) 127/59   Pulse 64   Temp 97.1 °F (36.2 °C) (Temporal)   Resp 16   Ht 5' 3\" (1.6 m)   Wt 146 lb 12.8 oz (66.6 kg)   SpO2 100%   BMI 26.00 kg/m²     Allergies:  Patient has no known allergies. Medications:    Prior to Admission medications    Medication Sig Start Date End Date Taking?  Authorizing Provider   diclofenac sodium (VOLTAREN) 1 % GEL Apply 2 g topically 4 times daily as needed for Pain   Yes Historical Provider, MD   alendronate (FOSAMAX) 70 MG tablet Take 70 mg by mouth every 7 days Takes Mondays   Yes Historical Provider, MD   albuterol sulfate HFA (VENTOLIN HFA) 108 (90 Base) MCG/ACT inhaler Inhale 2 puffs into the lungs every 6 hours as needed for Wheezing   Yes Historical Provider, MD   vitamin C (ASCORBIC ACID) 500 MG tablet Take 1,000 mg by mouth daily   Yes Historical Provider, MD   b complex vitamins capsule Take 1 capsule by mouth daily   Yes Historical Provider, MD   Cholecalciferol (VITAMIN D3) 125 MCG (5000 UT) TABS Take 1 tablet by mouth every other day   Yes Historical Provider, MD   lipase-protease-amylase (CREON) 50062-923737 units CPEP delayed release capsule Take 180,000 Units by mouth daily 5/2/18   Historical Provider, MD   atorvastatin (LIPITOR) 10 MG tablet Take 10 mg by mouth daily 9/22/17   Historical Provider, MD   cycloSPORINE (RESTASIS) 0.05 % ophthalmic emulsion Apply 1 drop to eye daily 2/2/18   Historical Provider, MD   ammonium lactate (AMLACTIN) 12 % cream Apply 12 each topically daily 5/15/18   Historical Provider, MD   [address] [facility] 5 year handicap placard 1/18/19   Ajay Acuna PA-C   cyclobenzaprine (FLEXERIL) 10 MG tablet 10 mg 12/28/18   Historical Provider, MD   gabapentin (NEURONTIN) 300 MG capsule 300 mg. . 1/9/19   Historical Provider, MD   omeprazole (PRILOSEC) 20 MG delayed release capsule Take 20 mg by mouth daily    Historical Provider, MD         This is a 80 y.o. healthy female who is pleasant, cooperative, alert and oriented x3, in no acute distress. Heart: Heart sounds are normal.  HR 64 regular rate and rhythm without murmur, gallop or rub. No carotid bruits  Lungs: Normal respiratory effort with equal expansion, good air exchange, unlabored and clear to auscultation without wheezes or rales bilaterally   Abdomen: soft, nontender, nondistended with bowel sounds . Investigations:      Laboratory Testing:  Recent Results (from the past 24 hour(s))   CBC    Collection Time: 08/17/22 11:54 AM   Result Value Ref Range    WBC 6.7 3.5 - 11.3 k/uL    RBC 3.62 (L) 3.95 - 5.11 m/uL    Hemoglobin 9.8 (L) 11.9 - 15.1 g/dL    Hematocrit 32.3 (L) 36.3 - 47.1 %    MCV 89.2 82.6 - 102.9 fL    MCH 27.1 25.2 - 33.5 pg    MCHC 30.3 28.4 - 34.8 g/dL    RDW 13.6 11.8 - 14.4 %    Platelets 607 460 - 225 k/uL    MPV 9.9 8.1 - 13.5 fL    NRBC Automated 0.0 0.0 per 100 WBC       No results for input(s): COVID19 in the last 720 hours.   Imaging/Diagnostics:    [facility] FOR SURGICAL PROCEDURES    Result Date: 7/27/2022  Radiology result is complete; follow up with provider / physician office for radiology results       LEIF Bruno CNP    Electronically signed 8/17/2022 at 12:06 PM

## 2022-08-18 LAB
EKG ATRIAL RATE: 62 BPM
EKG P AXIS: 71 DEGREES
EKG P-R INTERVAL: 158 MS
EKG Q-T INTERVAL: 434 MS
EKG QRS DURATION: 86 MS
EKG QTC CALCULATION (BAZETT): 440 MS
EKG R AXIS: 3 DEGREES
EKG T AXIS: 29 DEGREES
EKG VENTRICULAR RATE: 62 BPM

## 2022-08-23 ENCOUNTER — ANESTHESIA EVENT (OUTPATIENT)
Dept: OPERATING ROOM | Age: 82
End: 2022-08-23
Payer: MEDICARE

## 2022-08-24 ENCOUNTER — HOSPITAL ENCOUNTER (OUTPATIENT)
Age: 82
Setting detail: OUTPATIENT SURGERY
Discharge: HOME | End: 2022-08-24
Attending: ANESTHESIOLOGY | Admitting: ANESTHESIOLOGY
Payer: MEDICARE

## 2022-08-24 ENCOUNTER — APPOINTMENT (OUTPATIENT)
Dept: GENERAL RADIOLOGY | Age: 82
End: 2022-08-24
Attending: ANESTHESIOLOGY
Payer: MEDICARE

## 2022-08-24 ENCOUNTER — ANESTHESIA (OUTPATIENT)
Dept: OPERATING ROOM | Age: 82
End: 2022-08-24
Payer: MEDICARE

## 2022-08-24 VITALS
HEART RATE: 59 BPM | OXYGEN SATURATION: 100 % | RESPIRATION RATE: 15 BRPM | DIASTOLIC BLOOD PRESSURE: 75 MMHG | TEMPERATURE: 97 F | SYSTOLIC BLOOD PRESSURE: 152 MMHG

## 2022-08-24 PROCEDURE — C2618 PROBE/NEEDLE, CRYO: HCPCS | Performed by: ANESTHESIOLOGY

## 2022-08-24 PROCEDURE — 7100000010 HC PHASE II RECOVERY - FIRST 15 MIN: Performed by: ANESTHESIOLOGY

## 2022-08-24 PROCEDURE — 2500000003 HC RX 250 WO HCPCS: Performed by: ANESTHESIOLOGY

## 2022-08-24 PROCEDURE — 6360000002 HC RX W HCPCS: Performed by: NURSE ANESTHETIST, CERTIFIED REGISTERED

## 2022-08-24 PROCEDURE — 0275T PR PERC LAMINO-/LAMINECTOMY INDIR IMAG GUIDE LUMBAR: CPT | Performed by: ANESTHESIOLOGY

## 2022-08-24 PROCEDURE — 2500000003 HC RX 250 WO HCPCS: Performed by: NURSE ANESTHETIST, CERTIFIED REGISTERED

## 2022-08-24 PROCEDURE — 3209999900 FLUORO FOR SURGICAL PROCEDURES

## 2022-08-24 PROCEDURE — 7100000011 HC PHASE II RECOVERY - ADDTL 15 MIN: Performed by: ANESTHESIOLOGY

## 2022-08-24 PROCEDURE — 3600000055 HC PAIN LEVEL 3 ADDL 15 MIN: Performed by: ANESTHESIOLOGY

## 2022-08-24 PROCEDURE — 3600000054 HC PAIN LEVEL 3 BASE: Performed by: ANESTHESIOLOGY

## 2022-08-24 PROCEDURE — 3700000000 HC ANESTHESIA ATTENDED CARE: Performed by: ANESTHESIOLOGY

## 2022-08-24 PROCEDURE — 2709999900 HC NON-CHARGEABLE SUPPLY: Performed by: ANESTHESIOLOGY

## 2022-08-24 PROCEDURE — 6360000002 HC RX W HCPCS: Performed by: ANESTHESIOLOGY

## 2022-08-24 PROCEDURE — 2580000003 HC RX 258: Performed by: ANESTHESIOLOGY

## 2022-08-24 PROCEDURE — 3700000001 HC ADD 15 MINUTES (ANESTHESIA): Performed by: ANESTHESIOLOGY

## 2022-08-24 PROCEDURE — 6360000004 HC RX CONTRAST MEDICATION: Performed by: ANESTHESIOLOGY

## 2022-08-24 RX ORDER — FENTANYL CITRATE 50 UG/ML
INJECTION, SOLUTION INTRAMUSCULAR; INTRAVENOUS PRN
Status: DISCONTINUED | OUTPATIENT
Start: 2022-08-24 | End: 2022-08-24 | Stop reason: SDUPTHER

## 2022-08-24 RX ORDER — LIDOCAINE HYDROCHLORIDE 10 MG/ML
INJECTION, SOLUTION EPIDURAL; INFILTRATION; INTRACAUDAL; PERINEURAL PRN
Status: DISCONTINUED | OUTPATIENT
Start: 2022-08-24 | End: 2022-08-24 | Stop reason: ALTCHOICE

## 2022-08-24 RX ORDER — SODIUM CHLORIDE, SODIUM LACTATE, POTASSIUM CHLORIDE, CALCIUM CHLORIDE 600; 310; 30; 20 MG/100ML; MG/100ML; MG/100ML; MG/100ML
INJECTION, SOLUTION INTRAVENOUS CONTINUOUS
Status: DISCONTINUED | OUTPATIENT
Start: 2022-08-24 | End: 2022-08-24 | Stop reason: HOSPADM

## 2022-08-24 RX ORDER — SODIUM CHLORIDE 9 MG/ML
INJECTION, SOLUTION INTRAVENOUS PRN
Status: DISCONTINUED | OUTPATIENT
Start: 2022-08-24 | End: 2022-08-24

## 2022-08-24 RX ORDER — SODIUM CHLORIDE 0.9 % (FLUSH) 0.9 %
5-40 SYRINGE (ML) INJECTION PRN
Status: CANCELLED | OUTPATIENT
Start: 2022-08-24

## 2022-08-24 RX ORDER — SODIUM CHLORIDE 9 MG/ML
INJECTION, SOLUTION INTRAVENOUS PRN
Status: DISCONTINUED | OUTPATIENT
Start: 2022-08-24 | End: 2022-08-24 | Stop reason: HOSPADM

## 2022-08-24 RX ORDER — HYDROMORPHONE HYDROCHLORIDE 1 MG/ML
0.25 INJECTION, SOLUTION INTRAMUSCULAR; INTRAVENOUS; SUBCUTANEOUS EVERY 5 MIN PRN
Status: CANCELLED | OUTPATIENT
Start: 2022-08-24

## 2022-08-24 RX ORDER — ONDANSETRON 2 MG/ML
4 INJECTION INTRAMUSCULAR; INTRAVENOUS
Status: CANCELLED | OUTPATIENT
Start: 2022-08-24 | End: 2022-08-24

## 2022-08-24 RX ORDER — MIDAZOLAM HYDROCHLORIDE 1 MG/ML
INJECTION INTRAMUSCULAR; INTRAVENOUS PRN
Status: DISCONTINUED | OUTPATIENT
Start: 2022-08-24 | End: 2022-08-24 | Stop reason: SDUPTHER

## 2022-08-24 RX ORDER — SODIUM CHLORIDE 0.9 % (FLUSH) 0.9 %
5-40 SYRINGE (ML) INJECTION EVERY 12 HOURS SCHEDULED
Status: DISCONTINUED | OUTPATIENT
Start: 2022-08-24 | End: 2022-08-24 | Stop reason: HOSPADM

## 2022-08-24 RX ORDER — SODIUM CHLORIDE 0.9 % (FLUSH) 0.9 %
5-40 SYRINGE (ML) INJECTION PRN
Status: DISCONTINUED | OUTPATIENT
Start: 2022-08-24 | End: 2022-08-24

## 2022-08-24 RX ORDER — SODIUM CHLORIDE 0.9 % (FLUSH) 0.9 %
5-40 SYRINGE (ML) INJECTION EVERY 12 HOURS SCHEDULED
Status: CANCELLED | OUTPATIENT
Start: 2022-08-24

## 2022-08-24 RX ORDER — BUPIVACAINE HYDROCHLORIDE AND EPINEPHRINE 5; 5 MG/ML; UG/ML
INJECTION, SOLUTION EPIDURAL; INTRACAUDAL; PERINEURAL
Status: DISCONTINUED
Start: 2022-08-24 | End: 2022-08-24 | Stop reason: HOSPADM

## 2022-08-24 RX ORDER — SODIUM CHLORIDE 0.9 % (FLUSH) 0.9 %
5-40 SYRINGE (ML) INJECTION PRN
Status: DISCONTINUED | OUTPATIENT
Start: 2022-08-24 | End: 2022-08-24 | Stop reason: HOSPADM

## 2022-08-24 RX ORDER — DEXAMETHASONE SODIUM PHOSPHATE 10 MG/ML
INJECTION INTRAMUSCULAR; INTRAVENOUS PRN
Status: DISCONTINUED | OUTPATIENT
Start: 2022-08-24 | End: 2022-08-24 | Stop reason: ALTCHOICE

## 2022-08-24 RX ORDER — PROPOFOL 10 MG/ML
INJECTION, EMULSION INTRAVENOUS PRN
Status: DISCONTINUED | OUTPATIENT
Start: 2022-08-24 | End: 2022-08-24 | Stop reason: SDUPTHER

## 2022-08-24 RX ORDER — BUPIVACAINE HYDROCHLORIDE AND EPINEPHRINE 5; 5 MG/ML; UG/ML
INJECTION, SOLUTION EPIDURAL; INTRACAUDAL; PERINEURAL PRN
Status: DISCONTINUED | OUTPATIENT
Start: 2022-08-24 | End: 2022-08-24 | Stop reason: ALTCHOICE

## 2022-08-24 RX ORDER — FENTANYL CITRATE 50 UG/ML
25 INJECTION, SOLUTION INTRAMUSCULAR; INTRAVENOUS EVERY 5 MIN PRN
Status: CANCELLED | OUTPATIENT
Start: 2022-08-24

## 2022-08-24 RX ORDER — LIDOCAINE HYDROCHLORIDE 10 MG/ML
1 INJECTION, SOLUTION EPIDURAL; INFILTRATION; INTRACAUDAL; PERINEURAL
Status: DISCONTINUED | OUTPATIENT
Start: 2022-08-24 | End: 2022-08-24 | Stop reason: HOSPADM

## 2022-08-24 RX ORDER — SODIUM CHLORIDE 9 MG/ML
INJECTION, SOLUTION INTRAVENOUS PRN
Status: CANCELLED | OUTPATIENT
Start: 2022-08-24

## 2022-08-24 RX ORDER — SODIUM CHLORIDE 9 MG/ML
INJECTION, SOLUTION INTRAVENOUS CONTINUOUS
Status: DISCONTINUED | OUTPATIENT
Start: 2022-08-25 | End: 2022-08-24

## 2022-08-24 RX ORDER — LIDOCAINE HYDROCHLORIDE 20 MG/ML
INJECTION, SOLUTION EPIDURAL; INFILTRATION; INTRACAUDAL; PERINEURAL PRN
Status: DISCONTINUED | OUTPATIENT
Start: 2022-08-24 | End: 2022-08-24 | Stop reason: SDUPTHER

## 2022-08-24 RX ADMIN — LIDOCAINE HYDROCHLORIDE 25 MG: 20 INJECTION, SOLUTION EPIDURAL; INFILTRATION; INTRACAUDAL; PERINEURAL at 13:52

## 2022-08-24 RX ADMIN — Medication 25 MCG: at 14:24

## 2022-08-24 RX ADMIN — CEFAZOLIN 2000 MG: 10 INJECTION, POWDER, FOR SOLUTION INTRAVENOUS at 13:45

## 2022-08-24 RX ADMIN — MIDAZOLAM 1 MG: 1 INJECTION INTRAMUSCULAR; INTRAVENOUS at 13:47

## 2022-08-24 RX ADMIN — Medication 25 MCG: at 13:47

## 2022-08-24 RX ADMIN — PROPOFOL 25 MCG/KG/MIN: 10 INJECTION, EMULSION INTRAVENOUS at 13:58

## 2022-08-24 RX ADMIN — PROPOFOL 25 MG: 10 INJECTION, EMULSION INTRAVENOUS at 13:52

## 2022-08-24 RX ADMIN — SODIUM CHLORIDE, POTASSIUM CHLORIDE, SODIUM LACTATE AND CALCIUM CHLORIDE: 600; 310; 30; 20 INJECTION, SOLUTION INTRAVENOUS at 12:22

## 2022-08-24 RX ADMIN — Medication 25 MCG: at 14:02

## 2022-08-24 RX ADMIN — Medication 25 MCG: at 13:42

## 2022-08-24 RX ADMIN — MIDAZOLAM 1 MG: 1 INJECTION INTRAMUSCULAR; INTRAVENOUS at 13:42

## 2022-08-24 ASSESSMENT — PAIN - FUNCTIONAL ASSESSMENT
PAIN_FUNCTIONAL_ASSESSMENT: 0-10
PAIN_FUNCTIONAL_ASSESSMENT: 0-10

## 2022-08-24 NOTE — OP NOTE
Operative Note      Patient: Anshul Heller  YOB: 1940  MRN: [id number]    Date of Procedure: 8/24/2022    Pre-Op Diagnosis: lumbar spinal stensoes with neurogenic claudictaion    Post-Op Diagnosis: Same       Procedure(s):  Percutaneous image guided lumbar decompression using MILD device from [facility] AT L3 / 4 AND L4-L5    Surgeon(s):  Jenna Adam MD    Assistant:   * No surgical staff found *    Anesthesia: Monitor Anesthesia Care    Estimated Blood Loss (mL): Minimal    Complications: None    Specimens:   * No specimens in log *    Implants:  * No implants in log *      Drains: * No LDAs found *    Findings: n/a    Detailed Description of Procedure:   mild®: A MEDICARE CLAIMS STUDY  Current Procedural Terminology (CPT®) code 26T -- Categorized by Medicare as percutaneous   image-guided lumbar decompression (PILD), for the treatment of lumbar spinal stenosis (LSS) with   neurogenic claudication (NC) and ambulatory payment classification (APC) 5114 and covered under   national coverage determination 150.13 under the Coverage with Evidence Development (ANALY) Program  Z00.6 -- Encounter for examination for normal comparison and control in clinical research   program  Q0 Modifier -- Investigational clinical service provided in a clinical research study that is in an   approved clinical research study  Condition Code 30 (Institutional Only) -- Non-research services provided to all patients,   including managed care enrollees, enrolled in a qualified clinical trial  National Clinical Trial Number -- [id number]      PREOPERATIVE DIAGNOSIS:  M48.062 --Spinal stenosis, lumbar region with neurogenic claudication at L3/4 AND L4/5    POSTOPERATIVE DIAGNOSIS:  M48.062 --Spinal stenosis, lumbar region with neurogenic claudication at L3/4 AN DL4/5 ,    PROCEDURE PERFORMED:  1. percutaneous lumbar decompression, BILATERAL APPROACH  2. Fluoroscopic guidance  3.  Epidurogram at the target area that is being treated and debulked    ANESTHESIA: MILD    ANTIBIOTICS: ANCEF 2 GM IV    INDICATIONS FOR PROCEDURE:  The patients ID was confirmed, and a time-out was performed. Time, site, location, and procedure   agreed upon and consented for were reviewed. The patient was radiologically confirmed to be suffering   from clinically symptomatic lumbar spinal stenosis and confirmed neurogenic claudication. The patients   condition has progressed such that activities of daily living are limited due to pain intensity and   neurogenic claudication symptomology. Standing time and walking distance have declined steadily. Patients clinical notes for office visits show tried and failed conservative treatments, such as physical   therapy, pharmaceutical management, or other treatments for chronic low back pain and neurogenic   claudication. According to the treatment algorithm for lumbar spinal stenosis, the patient is a candidate   for a lumbar decompression procedure. The patient recalls our previous office discussion regarding the indications, risks, and potential benefits   of the mild® procedure-- a percutaneous lumbar decompression. Informed consent was obtained, and   the patient indicated understanding and agreed with the treatment proposed. PROCEDURE:  The patient was placed in the prone position in the fluoroscopy suite with a pillow (bolster) under the   hips to assist with reversing lordosis of the spine. Following IV sedation and antibiotic administration,   the patient was prepped and draped in my usual standard fashion. Ample amounts of local anesthetic were used to anesthetize the skin and deep facial planes after  topographical landmarks were identified. Then, an epidurogram was performed under fluoroscopic   guidance at the level of concern, by the insertion of a Tuohy needle and use of minimal amounts of   myelographically compatible contrast, which showed decreased contrast flow past the levels of concern.    An oblique contralateral view of the epidurogram was undertaken, which indicated lumbar spinal  stenosis as shown by the decreased contrast flow. Starting from right side, a small incision was made with a sharp scalpel blade, and a trocar with portal was inserted percutaneously under fluoroscopic guidance to contact the lamina indicated. A bone-sculpting rongeur was inserted to Remove  adequate amounts of lamina (laminotomy) from the superior surface of the inferior operative   lamina site and from the inferior aspect of the lamina above it. The laminotomy created a passage for the   tissue sculpting instrument used in debulking the ligamentum flavum. Same steps were performed from left side for left sided decompression. A bilateral approach was used accessing the target zone form both sides. A similar procedure was performed at additional levels L3/4  following the same treatment steps. There were no complications or difficulties noted with these procedures. The epidurogram was refreshed repeatedly throughout the procedure and demonstrated improvement   in the original narrowed canal. Upon completion of the procedure, instruments were removed,   hemostasis was achieved by direct pressure, and closure of the wounds were performed. Appropriate   dressings were used. The patient tolerated the procedure well. Upon transferring the patient to the   recovery room, the patients vital signs remained stable and without any neurologic deficits. A brief   neurological exam was performed and showed adequate strength and no loss of sensation in the lower   extremities. The patient was discharged with instructions to rest, continue with ice, and to demonstrate   progressive mobility. The patient was given a follow-up exam time and date along with instructions and   contact information for any questions or concerns.     The patient is to be followed up in the office for further evaluation and treatment as deemed appropriate and necessary and for any concerns regarding the procedure. CONCLUSION:  A successful fluoroscopically guided percutaneous L3/4 AND L4/5 lumbar decompression was undertaken at the  indicated levels above with IV sedation and adequate local anesthesia. The patient was neurologically   intact without complications, freely able to move and respond to commands with IV sedation, and able   to move lower extremities. Movement of toes, feet, knees, and hips were reviewed prior to being discharged   from the procedure room and the recovery room, as indicated.  There was no new loss of sensation   over the lower extremities      Electronically signed by Delmar Levine MD on 8/24/2022 at 2:56 PM

## 2022-08-24 NOTE — ANESTHESIA PRE PROCEDURE
Department of Anesthesiology  Preprocedure Note       Name:  Neita Phoenix   Age:  80 y.o.  :  1940                                          MRN:  [id number]         Date:  2022      Surgeon: Aditya Ling):  Marielle Adkins MD    Procedure: Procedure(s):  Percutaneous image guided lumbar decompression using MILD device from [facility] AT L3 / 4 AND L4-L5    Medications prior to admission:   Prior to Admission medications    Medication Sig Start Date End Date Taking? Authorizing Provider   diclofenac sodium (VOLTAREN) 1 % GEL Apply 2 g topically 4 times daily as needed for Pain    Historical Provider, MD   alendronate (FOSAMAX) 70 MG tablet Take 70 mg by mouth every 7 days Takes     Historical Provider, MD   albuterol sulfate HFA (PROVENTIL;VENTOLIN;PROAIR) 108 (90 Base) MCG/ACT inhaler Inhale 2 puffs into the lungs every 6 hours as needed for Wheezing    Historical Provider, MD   vitamin C (ASCORBIC ACID) 500 MG tablet Take 1,000 mg by mouth daily    Historical Provider, MD   b complex vitamins capsule Take 1 capsule by mouth daily    Historical Provider, MD   Cholecalciferol (VITAMIN D3) 125 MCG (5000 UT) TABS Take 1 tablet by mouth every other day    Historical Provider, MD   lipase-protease-amylase (CREON) 35527-380140 units CPEP delayed release capsule Take 180,000 Units by mouth daily 18   Historical Provider, MD   atorvastatin (LIPITOR) 10 MG tablet Take 10 mg by mouth daily 17   Historical Provider, MD   cycloSPORINE (RESTASIS) 0.05 % ophthalmic emulsion Apply 1 drop to eye daily 18   Historical Provider, MD   ammonium lactate (AMLACTIN) 12 % cream Apply 12 each topically daily 5/15/18   Historical Provider, MD   Handicap Placard [address] [facility] 5 year handicap placard 19   Jose Babcock PA-C   cyclobenzaprine (FLEXERIL) 10 MG tablet 10 mg 18   Historical Provider, MD   gabapentin (NEURONTIN) 300 MG capsule 300 mg. . 19   Historical Provider, MD   omeprazole (PRILOSEC) 20 MG delayed release capsule Take 20 mg by mouth daily    Historical Provider, MD       Current medications:    Current Facility-Administered Medications   Medication Dose Route Frequency Provider Last Rate Last Admin    sodium chloride flush 0.9 % injection 5-40 mL  5-40 mL IntraVENous 2 times per day Minda Massey MD        lidocaine PF 1 % injection 1 mL  1 mL IntraDERmal Once PRN Derotha Hernandez, DO        lactated ringers infusion   IntraVENous Continuous Derotha Hernandez,  mL/hr at 08/24/22 1222 New Bag at 08/24/22 1222    sodium chloride flush 0.9 % injection 5-40 mL  5-40 mL IntraVENous 2 times per day Kodi Damian, DO        sodium chloride flush 0.9 % injection 5-40 mL  5-40 mL IntraVENous PRN Derotha Hernandez, DO        0.9 % sodium chloride infusion   IntraVENous PRN Kodi Damian, DO        bupivacaine-EPINEPHrine PF (MARCAINE-w/EPINEPHRINE) 0.5% -[id number] injection                Allergies:  No Known Allergies    Problem List:    Patient Active Problem List   Diagnosis Code    Spinal stenosis of lumbar region with neurogenic claudication M48.062    Lumbosacral spondylosis without myelopathy M47.817       Past Medical History:        Diagnosis Date    Acid reflux     Arthritis     History of blood transfusion     Hyperlipidemia     Pneumonia involving left lung 03/10/2020       Past Surgical History:        Procedure Laterality Date    CARDIAC CATHETERIZATION  1999    CATARACT REMOVAL      GALEN    HIP SURGERY      galen GOPAL    JOINT REPLACEMENT         Social History:    Social History     Tobacco Use    Smoking status: Never    Smokeless tobacco: Never   Substance Use Topics    Alcohol use:  No                                Counseling given: Not Answered      Vital Signs (Current):   Vitals:    08/24/22 1207   BP: 128/62   Pulse: 59   Resp: 16   Temp: 97.3 °F (36.3 °C)   TempSrc: Temporal   SpO2: 98%                                              BP Readings from Last 3 Encounters:   08/24/22 128/62   08/17/22 (!) 127/59   07/27/22 (!) 141/75       NPO Status: Time of last liquid consumption: 2200                        Time of last solid consumption: 2200                        Date of last liquid consumption: 08/23/22                        Date of last solid food consumption: 08/23/22    BMI:   Wt Readings from Last 3 Encounters:   08/17/22 146 lb 12.8 oz (66.6 kg)   08/02/22 145 lb (65.8 kg)   07/27/22 145 lb (65.8 kg)     There is no height or weight on file to calculate BMI.    CBC:   Lab Results   Component Value Date/Time    WBC 6.7 08/17/2022 11:54 AM    RBC 3.62 08/17/2022 11:54 AM    HGB 9.8 08/17/2022 11:54 AM    HCT 32.3 08/17/2022 11:54 AM    MCV 89.2 08/17/2022 11:54 AM    RDW 13.6 08/17/2022 11:54 AM     08/17/2022 11:54 AM       CMP:   Lab Results   Component Value Date/Time     08/17/2022 11:54 AM    K 3.7 08/17/2022 11:54 AM     08/17/2022 11:54 AM    CO2 28 08/17/2022 11:54 AM    BUN 16 08/17/2022 11:54 AM    CREATININE 0.79 08/17/2022 11:54 AM    GFRAA >60 08/17/2022 11:54 AM    LABGLOM >60 08/17/2022 11:54 AM       POC Tests: No results for input(s): POCGLU, POCNA, POCK, POCCL, POCBUN, POCHEMO, POCHCT in the last 72 hours.     Coags: No results found for: PROTIME, INR, APTT    HCG (If Applicable): No results found for: PREGTESTUR, PREGSERUM, HCG, HCGQUANT     ABGs: No results found for: PHART, PO2ART, JQG9FSV, MBX1WTA, BEART, N8SSJBYG     Type & Screen (If Applicable):  No results found for: LABABO, LABRH    Drug/Infectious Status (If Applicable):  No results found for: HIV, HEPCAB    COVID-19 Screening (If Applicable): No results found for: COVID19        Anesthesia Evaluation   no history of anesthetic complications:   Airway: Mallampati: II  TM distance: >3 FB   Neck ROM: full  Mouth opening: > = 3 FB   Dental:          Pulmonary:normal exam    (+) pneumonia: resolved,                             Cardiovascular:  Exercise tolerance: good (>4 METS),   (+) hyperlipidemia    (-)  angina                Neuro/Psych:   (+) neuromuscular disease:,             GI/Hepatic/Renal:   (+) GERD: well controlled,           Endo/Other:    (+) blood dyscrasia: anemia, arthritis: OA., .                 Abdominal:             Vascular: Other Findings:           Anesthesia Plan      general and TIVA     ASA 3       Induction: intravenous. MIPS: Postoperative opioids intended and Prophylactic antiemetics administered. Anesthetic plan and risks discussed with patient. Plan discussed with CRNA.     Attending anesthesiologist reviewed and agrees with Brad Monroe MD   8/24/2022

## 2022-08-24 NOTE — DISCHARGE INSTRUCTIONS
mild® Post-Procedure Care  To achieve optimal recovery and results,   follow these instructions carefully. What to Expect After the mild® Procedure     _    You should be discharged and able to walk on the same day of the procedure. _    As with any procedure, you may feel sore afterwards. If you feel extreme discomfort or pain, contact your physician immediately. Medication     Tell your physician about any prescribed or over-the-counter medications you are currently taking. Continue taking them as directed by your physician. If you feel soreness or minor discomfort, you may take the prescribe pain medication directed     Activity Restrictions & Resumption     Avoid strenuous exercise and heavy lifting for 7 days. Walking is the best exercise, and daily walking is strongly recommended. Gradually increase the length and distance of your walks. Start inside your home, then progress to out and around your home, as tolerated, and progress to your neighborhood or shopping center. Try to limit long car rides (greater than 1 hour) for a few weeks, or as tolerated. Do not drive while on pain medications. Start Home exercise program /physical therapy / Rehab as planned 1 week post op for physical conditioning and core strengthening. Incision Care     Remove the outer dressings 5 days after the procedure. Adhesive strips will cover the incision(s) and should begin to fall off within 7-10 days after the procedure. If they have not fallen off after 14 days, you may remove them. You may begin to shower 5 days after the procedure. Do not sit in the bath. Do not rub the incision. Do not apply lotion, medication, or cream to the incision. Post-Procedure Office Visits     __  If your follow-up appointments have not been scheduled, please contact [ENTER NAME] at the phone number below within 24 hours after your procedure to schedule your appointments.   [address]  [address] [facility] [facility] [place] [address] [place] [address]  [phone]  Dr. Getachew Silva           Warning Signs     Call your physician immediately, at [phone], if you experience any of the following:  Constant bleeding from the incision that will not stop after applying direct pressure for 10 minutes. Swelling, redness, or a change in drainage at incision site (such as an increase in amount of fluid, a foul odor, or a change in color). A change in mental status, such as unusual behavior, confusion, or difficulty standing or walking. A temperature greater than 101 degrees Fahrenheit.         Additional Notes/Instructions

## 2022-08-24 NOTE — ANESTHESIA POSTPROCEDURE EVALUATION
Department of Anesthesiology  Postprocedure Note    Patient: Fer Pro  MRN: [id number]  [place]: 1940  Date of evaluation: 8/24/2022      Procedure Summary     Date: 08/24/22 Room / Location: [address] - INPATIENT    Anesthesia Start: 7231 Anesthesia Stop: 5839    Procedure: Percutaneous image guided lumbar decompression using MILD device from [facility] AT L3 / 4 AND L4-L5 (Bilateral: Back) Diagnosis:       Spinal stenosis of lumbar region with neurogenic claudication      (lumbar spinal stensoes with neurogenic claudictaion)    Surgeons: Dixie Leon MD Responsible Provider: Phan Pike MD    Anesthesia Type: general, TIVA ASA Status: 3          Anesthesia Type: No value filed.     Beti Phase I:      Beti Phase II: Beti Score: 8      Anesthesia Post Evaluation    Patient location during evaluation: PACU  Patient participation: complete - patient participated  Level of consciousness: awake  Airway patency: patent  Nausea & Vomiting: no nausea  Complications: no  Cardiovascular status: blood pressure returned to baseline  Respiratory status: acceptable  Hydration status: euvolemic  Comments: Multimodal analgesia pain management as indicated by procedure  Multimodal analgesia pain management approach

## 2022-08-24 NOTE — H&P
Interval H&P Note    Pt Name: Danny Sher  MRN: [id number]  YOB: 1940  Date of evaluation: 8/24/2022      [x] I have reviewed in epic the pain management progress note by Dr. Gucci Waters dated 8/2/2022 for an Interval History and Physical note. [x] I have examined  Danny Sher  There are no changes to the patient who is scheduled for Percutaneous image guided lumbar decompression using MILD device from [facility] AT L3 / 4 AND L4-L5 by Kia Pinto MD for lumbar spinal stensoes with neurogenic claudictaion. The patient denies new health changes, fever, chills, wheezing, cough, increased SOB, chest pain, open sores or wounds. Denies hx diabetes. Last Vitamin D3 8/23/2022. Vital signs: /62   Pulse 59   Temp 97.3 °F (36.3 °C) (Temporal)   Resp 16   SpO2 98%     Allergies:  Patient has no known allergies. Medications:    Prior to Admission medications    Medication Sig Start Date End Date Taking?  Authorizing Provider   diclofenac sodium (VOLTAREN) 1 % GEL Apply 2 g topically 4 times daily as needed for Pain    Historical Provider, MD   alendronate (FOSAMAX) 70 MG tablet Take 70 mg by mouth every 7 days Takes Mondays    Historical Provider, MD   albuterol sulfate HFA (PROVENTIL;VENTOLIN;PROAIR) 108 (90 Base) MCG/ACT inhaler Inhale 2 puffs into the lungs every 6 hours as needed for Wheezing    Historical Provider, MD   vitamin C (ASCORBIC ACID) 500 MG tablet Take 1,000 mg by mouth daily    Historical Provider, MD   b complex vitamins capsule Take 1 capsule by mouth daily    Historical Provider, MD   Cholecalciferol (VITAMIN D3) 125 MCG (5000 UT) TABS Take 1 tablet by mouth every other day    Historical Provider, MD   lipase-protease-amylase (CREON) 79774-790065 units CPEP delayed release capsule Take 180,000 Units by mouth daily 5/2/18   Historical Provider, MD   atorvastatin (LIPITOR) 10 MG tablet Take 10 mg by mouth daily 9/22/17   Historical Provider, MD   cycloSPORINE (RESTASIS) 0.05 % ophthalmic emulsion Apply 1 drop to eye daily 2/2/18   Historical Provider, MD   ammonium lactate (AMLACTIN) 12 % cream Apply 12 each topically daily 5/15/18   Historical Provider, MD   Handcarolyn Brower [address] [facility] 5 year handicap placard 1/18/19   Lavern Beverly PA-C   cyclobenzaprine (FLEXERIL) 10 MG tablet 10 mg 12/28/18   Historical Provider, MD   gabapentin (NEURONTIN) 300 MG capsule 300 mg. . 1/9/19   Historical Provider, MD   omeprazole (PRILOSEC) 20 MG delayed release capsule Take 20 mg by mouth daily    Historical Provider, MD         This is a 80 y.o. female who is pleasant, cooperative, alert and oriented x3, in no acute distress. Heart: Heart sounds are normal.  HR 59 regular rate and rhythm without murmur, gallop or rub. Lungs: Normal respiratory effort with equal expansion, good air exchange, unlabored and clear to auscultation without wheezes or rales bilaterally   Abdomen: soft, nontender, nondistended with bowel sounds active. Labs:  Recent Labs     08/17/22  1154   HGB 9.8*   HCT 32.3*   WBC 6.7   MCV 89.2         K 3.7      CO2 28   BUN 16   CREATININE 0.79       No results for input(s): COVID19 in the last 720 hours. LEIF Rubio CNP  Electronically signed 8/24/2022 at 12:57 PM       Jenna Adam MD   Physician   Specialty:  Pain Management   Progress Notes       Signed   Encounter Date:  8/2/2022          Related encounter: Office Visit from 8/2/2022 in [facility] [place]           Signed        E   The patient is a 80 y. o. Non- / non  female.           Chief Complaint   Patient presents with    Back Pain    Follow Up After Procedure       MBB         HPI     -66-year-old female with history of chronic low back pain  Onset of symptom many years ago  Pain located in the lower lumbar area  Completed physical therapy with limited benefit  Systemic NSAID therapy contraindicated because of renal risk associated with advanced age  Had recent MRI lumbar spine that showed multilevel lumbar facet arthropathy and severe lumbar spinal stenosis  Patient have no loss of bladder bowel control or focal deficit  She is not interested in any aggressive major spine surgeries  She is a high risk for surgery considering advanced age  Report numbness and heaviness in legs that comes with standing and walking and alleviates with rest and  Pain limits her ability to ambulate    For axial back pain we did a lumbar diagnostic medial branch nerve block that provided near complete relief of axial back pain    For claudication symptoms have suggested minimal invasive lumbar decompression using mild procedure  Procedures discussed at length with patient  She is very interested and eager to proceed       S/P: MBB        Outcome              Any improvement of activity? Yes              Any side effects (appetite,leg cramping,facial fleshing): none              Increase of pain:  No  Pain score Today:  0  % of pain relief: 95%  Pain diary (medial branch block): Yes     No results found for: LABA1C  No results found for: EAG        Past Medical History        Past Medical History:   Diagnosis Date    Acid reflux              Past Surgical History         Past Surgical History:   Procedure Laterality Date    CATARACT REMOVAL         GALEN    HIP SURGERY         galen GOPAL            Social History   Social History            Socioeconomic History    Marital status:        Spouse name: None    Number of children: None    Years of education: None    Highest education level: None   Tobacco Use    Smoking status: Never    Smokeless tobacco: Never   Substance and Sexual Activity    Alcohol use: No    Drug use: No            Family History   No family history on file. No Known Allergies     There were no vitals filed for this visit.      Current Facility-Administered Medications          Current Outpatient Medications   Medication Sig Dispense Refill lipase-protease-amylase (CREON) 61672-211775 units CPEP delayed release capsule Take 180,000 Units by mouth daily        atorvastatin (LIPITOR) 10 MG tablet Take 10 mg by mouth daily        cycloSPORINE (RESTASIS) 0.05 % ophthalmic emulsion Apply 1 drop to eye daily        ammonium lactate (AMLACTIN) 12 % cream Apply 12 each topically daily        Handicap Placard MISC 5 year handicap placard 1 each 0    cyclobenzaprine (FLEXERIL) 10 MG tablet 10 mg        gabapentin (NEURONTIN) 300 MG capsule 300 mg..        omeprazole (PRILOSEC) 20 MG delayed release capsule Take 20 mg by mouth daily        methylPREDNISolone (MEDROL DOSEPACK) 4 MG tablet Take by mouth. (Patient not taking: No sig reported) 1 kit 0                Current Facility-Administered Medications   Medication Dose Route Frequency Provider Last Rate Last Admin    lidocaine 1 % injection 4 mL  4 mL Intra-artICUlar Once Nicola Croak, DO        lidocaine 1 % injection 4 mL  4 mL Intra-artICUlar Once Nicola Croak, DO                Review of Systems   Constitutional: Negative. Negative for fever. HENT: Negative. Eyes: Negative. Respiratory: Negative. Cardiovascular: Negative. Musculoskeletal:  Positive for back pain. Objective:  General Appearance:  Uncomfortable, in pain, well-appearing and in no acute distress. Vital signs: (most recent): Height 5' 3\" (1.6 m), weight 145 lb (65.8 kg), not currently breastfeeding. Vital signs are normal.  No fever. Output: Producing urine and producing stool. HEENT: Normal HEENT exam.    Lungs:  Normal effort and normal respiratory rate. She is not in respiratory distress. Heart: Normal rate. Extremities: Normal range of motion. There is no deformity. Neurological: Patient is alert and oriented to person, place and time. Patient has normal coordination. Pupils:  Pupils are equal, round, and reactive to light. Pupils are equal.   Skin:  Warm and dry. No rash or cyanosis. Assessment & Plan     -80-year-old female with history of chronic low back pain  Onset of symptom many years ago  Pain located in the lower lumbar area  Completed physical therapy with limited benefit  Systemic NSAID therapy contraindicated because of renal risk associated with advanced age  Had recent MRI lumbar spine that showed multilevel lumbar facet arthropathy and severe lumbar spinal stenosis  Patient have no loss of bladder bowel control or focal deficit  She is not interested in any aggressive major spine surgeries  She is a high risk for surgery considering advanced age  Report numbness and heaviness in legs that comes with standing and walking and alleviates with rest and  Pain limits her ability to ambulate    For axial back pain we did a lumbar diagnostic medial branch nerve block that provided near complete relief of axial back pain    For claudication symptoms have suggested minimal invasive lumbar decompression using mild procedure  Procedures discussed at length with patient  She is very interested and eager to proceed     EXAMINATION: MRI OF THE LUMBAR SPINE WITHOUT CONTRAST, 7/8/2022 9:02 am       L2-L3: There is a circumferential disc bulge with facet and ligamentous hypertrophy. There is canal stenosis measuring 6 mm in AP dimension. There is mild left foraminal narrowing and no significant right foraminal narrowing. L3-L4: There is a circumferential disc bulge with facet and ligamentous hypertrophy. There is canal stenosis measuring 5 mm in AP dimension. There is mild left foraminal narrowing and no significant right foraminal narrowing. L4-L5: There is a circumferential disc bulge with facet and ligamentous hypertrophy. There is canal stenosis measuring 6 mm in AP dimension. There is mild bilateral foraminal narrowing. L5-S1: There is a circumferential disc bulge with facet hypertrophy. There is no canal stenosis or significant foraminal narrowing      1.  Lumbosacral spondylosis without myelopathy    2. Spinal stenosis of lumbar region with neurogenic claudication            Orders Placed This Encounter   Procedures    Case Request      Encounter Medications    No orders of the defined types were placed in this encounter.                Electronically signed by Jenna Adam MD on 8/2/2022 at 9:48 AM                 Revision History

## 2022-08-26 ENCOUNTER — TELEPHONE (OUTPATIENT)
Dept: PAIN MANAGEMENT | Age: 82
End: 2022-08-26

## 2022-08-26 DIAGNOSIS — G89.18 POST-OP PAIN: ICD-10-CM

## 2022-08-26 DIAGNOSIS — G89.18 POST-OP PAIN: Primary | ICD-10-CM

## 2022-08-26 RX ORDER — HYDROCODONE BITARTRATE AND ACETAMINOPHEN 5; 325 MG/1; MG/1
1 TABLET ORAL 2 TIMES DAILY PRN
Qty: 14 TABLET | Refills: 0 | Status: SHIPPED | OUTPATIENT
Start: 2022-08-26 | End: 2022-09-02

## 2022-08-26 NOTE — TELEPHONE ENCOUNTER
Norco ordered  Post op pain will improve in few days  Please schedule her follow up OV in 2-3 weeks with NP if its not already scheduled

## 2022-08-26 NOTE — TELEPHONE ENCOUNTER
Pt had MILD at [facility] on 08/24 and is having pain score is 8/10. No other sx's. Please advise?

## 2022-09-20 ENCOUNTER — OFFICE VISIT (OUTPATIENT)
Dept: PAIN MANAGEMENT | Age: 82
End: 2022-09-20
Payer: MEDICARE

## 2022-09-20 VITALS — WEIGHT: 145 LBS | OXYGEN SATURATION: 99 % | HEART RATE: 67 BPM | HEIGHT: 64 IN | BODY MASS INDEX: 24.75 KG/M2

## 2022-09-20 DIAGNOSIS — M47.817 LUMBOSACRAL SPONDYLOSIS WITHOUT MYELOPATHY: Primary | ICD-10-CM

## 2022-09-20 DIAGNOSIS — M48.062 SPINAL STENOSIS OF LUMBAR REGION WITH NEUROGENIC CLAUDICATION: ICD-10-CM

## 2022-09-20 PROCEDURE — G8400 PT W/DXA NO RESULTS DOC: HCPCS | Performed by: ANESTHESIOLOGY

## 2022-09-20 PROCEDURE — 1123F ACP DISCUSS/DSCN MKR DOCD: CPT | Performed by: ANESTHESIOLOGY

## 2022-09-20 PROCEDURE — 1036F TOBACCO NON-USER: CPT | Performed by: ANESTHESIOLOGY

## 2022-09-20 PROCEDURE — 1090F PRES/ABSN URINE INCON ASSESS: CPT | Performed by: ANESTHESIOLOGY

## 2022-09-20 PROCEDURE — 99212 OFFICE O/P EST SF 10 MIN: CPT | Performed by: ANESTHESIOLOGY

## 2022-09-20 PROCEDURE — G8417 CALC BMI ABV UP PARAM F/U: HCPCS | Performed by: ANESTHESIOLOGY

## 2022-09-20 PROCEDURE — G8427 DOCREV CUR MEDS BY ELIG CLIN: HCPCS | Performed by: ANESTHESIOLOGY

## 2022-09-20 ASSESSMENT — ENCOUNTER SYMPTOMS
NAUSEA: 0
DIARRHEA: 0
COUGH: 0
SORE THROAT: 0
VOMITING: 0
CONSTIPATION: 0
BACK PAIN: 1
WHEEZING: 0
SHORTNESS OF BREATH: 0

## 2022-09-20 NOTE — PROGRESS NOTES
The patient is a 80 y. o. Non- / non  female. Chief Complaint   Patient presents with    Follow Up After Procedure     Bilateral Lumbar Medial Branch nerve Blocks at the transverse processes of L3, L4, L5 under fluoroscopy guidance           HPI    S/P: Bilateral Lumbar Medial Branch nerve Blocks at the transverse processes of L3, L4, L5 under fluoroscopy guidance         Outcome   Any improvement of activity? Yes   Any side effects (appetite,leg cramping,facial fleshing):no   Increase of pain:  No  Pain score Today:  0  % of pain relief:100%  Pain diary (medial branch block): No    No results found for: LABA1C  No results found for: EAG     Past Medical History:   Diagnosis Date    Acid reflux     Arthritis     History of blood transfusion     Hyperlipidemia     Pneumonia involving left lung 03/10/2020        Past Surgical History:   Procedure Laterality Date    CARDIAC CATHETERIZATION  1999    CATARACT REMOVAL      GALEN    HIP SURGERY      galen GOPAL    JOINT REPLACEMENT      LUMBAR SPINE SURGERY Bilateral 8/24/2022    Percutaneous image guided lumbar decompression using MILD device from [employer] AT L3 / 4 AND L4-L5 performed by Daisy Sam MD at [address] History     Socioeconomic History    Marital status:    Tobacco Use    Smoking status: Never    Smokeless tobacco: Never   Vaping Use    Vaping Use: Never used   Substance and Sexual Activity    Alcohol use: No    Drug use: No       No family history on file. No Known Allergies    There were no vitals filed for this visit.     Current Outpatient Medications   Medication Sig Dispense Refill    diclofenac sodium (VOLTAREN) 1 % GEL Apply 2 g topically 4 times daily as needed for Pain      alendronate (FOSAMAX) 70 MG tablet Take 70 mg by mouth every 7 days Takes Mondays      albuterol sulfate HFA (PROVENTIL;VENTOLIN;PROAIR) 108 (90 Base) MCG/ACT inhaler Inhale 2 puffs into the lungs every 6 hours as needed for Wheezing vitamin C (ASCORBIC ACID) 500 MG tablet Take 1,000 mg by mouth daily      b complex vitamins capsule Take 1 capsule by mouth daily      Cholecalciferol (VITAMIN D3) 125 MCG (5000 UT) TABS Take 1 tablet by mouth every other day      lipase-protease-amylase (CREON) 94789-859781 units CPEP delayed release capsule Take 180,000 Units by mouth daily      atorvastatin (LIPITOR) 10 MG tablet Take 10 mg by mouth daily      cycloSPORINE (RESTASIS) 0.05 % ophthalmic emulsion Apply 1 drop to eye daily      ammonium lactate (AMLACTIN) 12 % cream Apply 12 each topically daily      Handicap Placard MISC 5 year handicap placard 1 each 0    cyclobenzaprine (FLEXERIL) 10 MG tablet 10 mg      gabapentin (NEURONTIN) 300 MG capsule 300 mg..      omeprazole (PRILOSEC) 20 MG delayed release capsule Take 20 mg by mouth daily       Current Facility-Administered Medications   Medication Dose Route Frequency Provider Last Rate Last Admin    lidocaine 1 % injection 4 mL  4 mL Intra-artICUlar Once Marcelino Fitting, DO        lidocaine 1 % injection 4 mL  4 mL Intra-artICUlar Once Marcelino Fitting, DO           Review of Systems   Constitutional:  Negative for chills and fever. HENT:  Negative for congestion and sore throat. Respiratory:  Negative for cough, shortness of breath and wheezing. Gastrointestinal:  Negative for constipation, diarrhea, nausea and vomiting. Musculoskeletal:  Positive for back pain. Objective:  Vital signs: (most recent): Height 5' 3.6\" (1.615 m), weight 145 lb (65.8 kg), not currently breastfeeding. No fever. Assessment & Plan  No diagnosis found. No orders of the defined types were placed in this encounter. No orders of the defined types were placed in this encounter.            Electronically signed by Aruna Williamson MA on 9/20/2022 at 10:17 AM

## 2022-09-20 NOTE — PROGRESS NOTES
The patient is a 80 y. o. Non- / non  female. Chief Complaint   Patient presents with    Follow Up After Procedure     Bilateral Lumbar Medial Branch nerve Blocks at the transverse processes of L3, L4, L5 under fluoroscopy guidance           ANDREW    Is a pleasant 20-year-old female with history of chronic low back and bilateral lower extremity pain  She is diagnosed with lumbar spondylosis and lumbar spinal stenosis    Failed different modalities  Recently we did mild procedure  Today here for postprocedure follow-up  Reported excellent outcome  Denies any complication  Reports 901% improvement with significant improvement in activity tolerance  Happy and satisfied with the outcome    Pain score Today:  0  % of pain relief:100%  Pain diary (medial branch block): No    No results found for: LABA1C  No results found for: EAG     Past Medical History:   Diagnosis Date    Acid reflux     Arthritis     History of blood transfusion     Hyperlipidemia     Pneumonia involving left lung 03/10/2020        Past Surgical History:   Procedure Laterality Date    CARDIAC CATHETERIZATION  1999    CATARACT REMOVAL      [facility]    HIP SURGERY      galen GOPAL    JOINT REPLACEMENT      LUMBAR SPINE SURGERY Bilateral 8/24/2022    Percutaneous image guided lumbar decompression using MILD device from [employer] AT L3 / 4 AND L4-L5 performed by Kelly Rousseau MD at [place] [address] History     Socioeconomic History    Marital status:      Spouse name: None    Number of children: None    Years of education: None    Highest education level: None   Tobacco Use    Smoking status: Never    Smokeless tobacco: Never   Vaping Use    Vaping Use: Never used   Substance and Sexual Activity    Alcohol use: No    Drug use: No       No family history on file.     No Known Allergies    Vitals:    09/20/22 1019   Pulse: 67   SpO2: 99%       Current Outpatient Medications   Medication Sig Dispense Refill    diclofenac sodium (VOLTAREN) 1 % GEL Apply 2 g topically 4 times daily as needed for Pain      alendronate (FOSAMAX) 70 MG tablet Take 70 mg by mouth every 7 days Takes Mondays      albuterol sulfate HFA (PROVENTIL;VENTOLIN;PROAIR) 108 (90 Base) MCG/ACT inhaler Inhale 2 puffs into the lungs every 6 hours as needed for Wheezing      vitamin C (ASCORBIC ACID) 500 MG tablet Take 1,000 mg by mouth daily      b complex vitamins capsule Take 1 capsule by mouth daily      Cholecalciferol (VITAMIN D3) 125 MCG (5000 UT) TABS Take 1 tablet by mouth every other day      lipase-protease-amylase (CREON) 27926-366362 units CPEP delayed release capsule Take 180,000 Units by mouth daily      atorvastatin (LIPITOR) 10 MG tablet Take 10 mg by mouth daily      cycloSPORINE (RESTASIS) 0.05 % ophthalmic emulsion Apply 1 drop to eye daily      ammonium lactate (AMLACTIN) 12 % cream Apply 12 each topically daily      Handicap Placard MISC 5 year handicap placard 1 each 0    cyclobenzaprine (FLEXERIL) 10 MG tablet 10 mg      gabapentin (NEURONTIN) 300 MG capsule 300 mg..      omeprazole (PRILOSEC) 20 MG delayed release capsule Take 20 mg by mouth daily       Current Facility-Administered Medications   Medication Dose Route Frequency Provider Last Rate Last Admin    lidocaine 1 % injection 4 mL  4 mL Intra-artICUlar Once Law Thomas, DO        lidocaine 1 % injection 4 mL  4 mL Intra-artICUlar Once Law Thomas,            Review of Systems   Constitutional:  Negative for chills and fever. HENT:  Negative for congestion and sore throat. Respiratory:  Negative for cough, shortness of breath and wheezing. Gastrointestinal:  Negative for constipation, diarrhea, nausea and vomiting. Musculoskeletal:  Positive for back pain. Objective:  General Appearance:  Comfortable, in no acute distress and in pain. Vital signs: (most recent): Pulse 67, height 5' 3.6\" (1.615 m), weight 145 lb (65.8 kg), SpO2 99 %, not currently breastfeeding.   Vital signs are normal. No fever. Output: Producing urine and producing stool. Lungs:  Normal effort. She is not in respiratory distress. Heart: Normal rate. Neurological: Patient is alert and oriented to person, place and time. Assessment & Plan    Is a pleasant 70-year-old female with history of chronic low back and bilateral lower extremity pain  She is diagnosed with lumbar spondylosis and lumbar spinal stenosis    Failed different modalities  Recently we did mild procedure  Today here for postprocedure follow-up  Reported excellent outcome  Denies any complication  Reports 493% improvement with significant improvement in activity tolerance  Happy and satisfied with the outcome    Follow-up in 3 months or as needed basis    1. Lumbosacral spondylosis without myelopathy    2. Spinal stenosis of lumbar region with neurogenic claudication        No orders of the defined types were placed in this encounter. No orders of the defined types were placed in this encounter.            Electronically signed by Ismael Chester MD on 9/20/2022 at 10:36 AM

## 2022-09-29 ENCOUNTER — OFFICE VISIT (OUTPATIENT)
Dept: ORTHOPEDIC SURGERY | Age: 82
End: 2022-09-29
Payer: MEDICARE

## 2022-09-29 VITALS
RESPIRATION RATE: 12 BRPM | BODY MASS INDEX: 24.75 KG/M2 | SYSTOLIC BLOOD PRESSURE: 118 MMHG | HEART RATE: 68 BPM | HEIGHT: 64 IN | DIASTOLIC BLOOD PRESSURE: 75 MMHG | WEIGHT: 145 LBS

## 2022-09-29 DIAGNOSIS — M54.32 SCIATICA OF LEFT SIDE: Primary | ICD-10-CM

## 2022-09-29 PROCEDURE — G8427 DOCREV CUR MEDS BY ELIG CLIN: HCPCS | Performed by: PHYSICIAN ASSISTANT

## 2022-09-29 PROCEDURE — 20611 DRAIN/INJ JOINT/BURSA W/US: CPT | Performed by: PHYSICIAN ASSISTANT

## 2022-09-29 PROCEDURE — 1123F ACP DISCUSS/DSCN MKR DOCD: CPT | Performed by: PHYSICIAN ASSISTANT

## 2022-09-29 PROCEDURE — G8420 CALC BMI NORM PARAMETERS: HCPCS | Performed by: PHYSICIAN ASSISTANT

## 2022-09-29 PROCEDURE — 1036F TOBACCO NON-USER: CPT | Performed by: PHYSICIAN ASSISTANT

## 2022-09-29 PROCEDURE — G8400 PT W/DXA NO RESULTS DOC: HCPCS | Performed by: PHYSICIAN ASSISTANT

## 2022-09-29 PROCEDURE — 99214 OFFICE O/P EST MOD 30 MIN: CPT | Performed by: PHYSICIAN ASSISTANT

## 2022-09-29 PROCEDURE — 1090F PRES/ABSN URINE INCON ASSESS: CPT | Performed by: PHYSICIAN ASSISTANT

## 2022-09-29 RX ORDER — TIZANIDINE 4 MG/1
4 TABLET ORAL 3 TIMES DAILY
Qty: 30 TABLET | Refills: 0 | Status: SHIPPED | OUTPATIENT
Start: 2022-09-29 | End: 2022-10-09

## 2022-09-29 RX ORDER — METHYLPREDNISOLONE ACETATE 40 MG/ML
40 INJECTION, SUSPENSION INTRA-ARTICULAR; INTRALESIONAL; INTRAMUSCULAR; SOFT TISSUE ONCE
Status: CANCELLED | OUTPATIENT
Start: 2022-09-29 | End: 2022-09-29

## 2022-09-29 RX ORDER — LIDOCAINE HYDROCHLORIDE 10 MG/ML
2 INJECTION, SOLUTION INFILTRATION; PERINEURAL ONCE
Status: CANCELLED | OUTPATIENT
Start: 2022-09-29 | End: 2022-09-29

## 2022-09-29 ASSESSMENT — ENCOUNTER SYMPTOMS
SHORTNESS OF BREATH: 0
DIARRHEA: 0
APNEA: 0
ABDOMINAL PAIN: 0
RESPIRATORY NEGATIVE: 1
COLOR CHANGE: 0
NAUSEA: 0
VOMITING: 0
ABDOMINAL DISTENTION: 0
COUGH: 0
CHEST TIGHTNESS: 0
CONSTIPATION: 0

## 2022-09-29 NOTE — PROGRESS NOTES
headaches. Psychiatric/Behavioral:  Positive for sleep disturbance. Objective :   /75   Pulse 68   Resp 12   Ht 5' 4\" (1.626 m)   Wt 145 lb (65.8 kg)   BMI 24.89 kg/m²  Body mass index is 24.89 kg/m². General: Domenica Parks is a 80 y.o. female who is alert and oriented and sitting comfortably in our office. LUMBAR SPINE    GEN: Alert and oriented X 3, in no acute distress. GAIT: The patient does stand with a normal spinal contour. The patient does walk easily on toes and does walk easily on heels. ROM: Lumbar spine reveals there is restriction in forward flexion to fingertips at the level of floor. There is no obvious irritability when extending from the forward flexed position. There is no restriction in right side bending, There is no restriction in left side bending. There is no restriction in right rotation. There is no restriction in left rotation. There is no pain in extension. There is no pain in single leg extension. PALPATION: Paravertebral spasm is present. Lumbosacral step off is present. There is left SI joint pain to palpation. There is no central spine pain to palpation. SKIN: Intact without rashes, lesions or ulcerations. NEURO: Straight leg raising is negative. Neurologic exam reveals 2+/4 patellar reflexes, 2+/4 achilles reflexes, focal neurologic deficits are not noted. VASC: Cap refill less than 2 sec. PT/DP pulses are 2+/4, popliteal pulses are 2+/4, femoral pulses are 2+/4. MUSC: 5/5 plantar flexors, 5/5 dorsi flexors, 5/5 EHL, 5/5 abductors, 5/5 adductors, 5/5 knee extensors, 5/5 hip flexors. SPECIAL TESTS:  negative Castro's reflex, negative diadochokinesis test, negative Babinski, negative Clonus,  negative Lhermitte's sign. Ibeth's signs is not present. Left Hip      GEN: Alert and oriented X 3, in no acute distress. GAIT: The patient's gait was observed while entering the exam room and was noted to be  antalgic.  The extremity is in anatomic alignment. SKIN: Intact without rashes, lesions, or ulcerations. No obvious deformity or swelling. NEURO: The patient responds to light touch throughout bilateral LE. Patellar and Achilles reflexes are 2/4. VASC: The bilateral LE is neurovascularly intact with 2/4 DP and 2/4 PT pulses. Brisk capillary refill. ROM: 0 degree flexion contracture, 100 degrees flexion, 40 degrees abduction, 30 degrees adduction, 30 degrees of internal rotation, 60 degrees of external rotation. MUSC: Strength is 5/5 flexion, abduction, internal rotation, external rotation. PALP: The patient is  tender to palpation over the greater trochanter. TEST: Log roll is negative. No apparent leg length discrepancy. negative Stenchfield test. negative Impingement test. Negative Trendelenburg test. Negative Samuel's test. Negative C sign. No labral clunks. No pain on compression. Radiology: previous xray reviewed    Procedure: none    Assessment:      1. Sciatica of left side         Plan: We had a long discussion about pain in the hip is in the groin and can sometimes go down the side but it will not start in the buttock and go down the back. I explained that the pain that she was having that was shooting down the back of her leg to her foot was called sciatica. That is something she should discuss with Dr. Isidro Lee. She asked for me to forward this note to Dr. Isidro Lee. As of this time she is not having any pain because the Zanaflex helped improve it significantly. I am going to give her another prescription of Zanaflex and she does not have any pills left that way if it flares up again she will have those to help. If it starts bothering her again she needs to follow-up with Dr. Isidro Lee to see what his recommendations would be. I am so happy that she is much better than she was the last time that I saw her and am encouraged with the progress she is making after her injections for her lumbar spine.   The patient will follow-up with me if she is having lateral hip pain and we can do an injection into the greater trochanteric bursa if needed. At her next visit we will get pre-clinic x-rays of both hips for her yearly check. I did include some back exercises and stretches in her after visit summary that she can do. She has much better range of motion and movement. She will follow-up with me as needed. Follow up:Return if symptoms worsen or fail to improve. Orders Placed This Encounter   Medications    tiZANidine (ZANAFLEX) 4 MG tablet     Sig: Take 1 tablet by mouth 3 times daily for 10 days     Dispense:  30 tablet     Refill:  0         No orders of the defined types were placed in this encounter. This note is created with the assistance of a speech recognition program.  While intending to generate a document that actually reflects the content of the visit, the document can still have some errors including those of syntax and sound a like substitutions which may escape proof reading. In such instances, actual meaning can be extrapolated by contextual diversion.      Electronically signed by Todd Thurston PA-C on 9/29/2022 at 1:05 PM

## 2022-12-06 ENCOUNTER — OFFICE VISIT (OUTPATIENT)
Dept: PAIN MANAGEMENT | Age: 82
End: 2022-12-06
Payer: MEDICARE

## 2022-12-06 VITALS
HEIGHT: 64 IN | BODY MASS INDEX: 24.75 KG/M2 | HEART RATE: 68 BPM | SYSTOLIC BLOOD PRESSURE: 116 MMHG | OXYGEN SATURATION: 98 % | WEIGHT: 145 LBS | DIASTOLIC BLOOD PRESSURE: 65 MMHG

## 2022-12-06 DIAGNOSIS — M48.062 SPINAL STENOSIS OF LUMBAR REGION WITH NEUROGENIC CLAUDICATION: Primary | ICD-10-CM

## 2022-12-06 PROCEDURE — 99213 OFFICE O/P EST LOW 20 MIN: CPT | Performed by: ANESTHESIOLOGY

## 2022-12-06 PROCEDURE — 1090F PRES/ABSN URINE INCON ASSESS: CPT | Performed by: ANESTHESIOLOGY

## 2022-12-06 PROCEDURE — G8484 FLU IMMUNIZE NO ADMIN: HCPCS | Performed by: ANESTHESIOLOGY

## 2022-12-06 PROCEDURE — 1123F ACP DISCUSS/DSCN MKR DOCD: CPT | Performed by: ANESTHESIOLOGY

## 2022-12-06 PROCEDURE — G8427 DOCREV CUR MEDS BY ELIG CLIN: HCPCS | Performed by: ANESTHESIOLOGY

## 2022-12-06 PROCEDURE — G8400 PT W/DXA NO RESULTS DOC: HCPCS | Performed by: ANESTHESIOLOGY

## 2022-12-06 PROCEDURE — 1036F TOBACCO NON-USER: CPT | Performed by: ANESTHESIOLOGY

## 2022-12-06 PROCEDURE — G8420 CALC BMI NORM PARAMETERS: HCPCS | Performed by: ANESTHESIOLOGY

## 2022-12-06 ASSESSMENT — ENCOUNTER SYMPTOMS
RESPIRATORY NEGATIVE: 1
BACK PAIN: 1
GASTROINTESTINAL NEGATIVE: 1

## 2022-12-06 NOTE — PROGRESS NOTES
The patient is a 80 y. o. Non- / non  female. Chief Complaint   Patient presents with    Back Pain        Patient is here today for: Back  Pain level: 4/10  Character: aching, sharp  Radiating: yes L leg  Weakness or numbness: no  Aggravating Factors: positions, stress  Alleviating Factors: stretches  Constant or intermitting: constant  Bladder/bowel loss: no        Past Medical History:   Diagnosis Date    Acid reflux     Arthritis     History of blood transfusion     Hyperlipidemia     Pneumonia involving left lung 03/10/2020        Past Surgical History:   Procedure Laterality Date    CARDIAC CATHETERIZATION  1999    CATARACT REMOVAL      [facility]    HIP SURGERY      galen GOPAL    JOINT REPLACEMENT      LUMBAR SPINE SURGERY Bilateral 8/24/2022    Percutaneous image guided lumbar decompression using MILD device from [employer] AT L3 / 4 AND L4-L5 performed by Marques Nicolas MD at [address] History     Socioeconomic History    Marital status:    Tobacco Use    Smoking status: Never    Smokeless tobacco: Never   Vaping Use    Vaping Use: Never used   Substance and Sexual Activity    Alcohol use: No    Drug use: No       No family history on file. No Known Allergies    There were no vitals filed for this visit.     Current Outpatient Medications   Medication Sig Dispense Refill    diclofenac sodium (VOLTAREN) 1 % GEL Apply 2 g topically 4 times daily as needed for Pain      alendronate (FOSAMAX) 70 MG tablet Take 70 mg by mouth every 7 days Takes Mondays      albuterol sulfate HFA (PROVENTIL;VENTOLIN;PROAIR) 108 (90 Base) MCG/ACT inhaler Inhale 2 puffs into the lungs every 6 hours as needed for Wheezing      vitamin C (ASCORBIC ACID) 500 MG tablet Take 1,000 mg by mouth daily      b complex vitamins capsule Take 1 capsule by mouth daily      Cholecalciferol (VITAMIN D3) 125 MCG (5000 UT) TABS Take 1 tablet by mouth every other day      lipase-protease-amylase (CREON) 79562-545010 units CPEP delayed release capsule Take 180,000 Units by mouth daily      atorvastatin (LIPITOR) 10 MG tablet Take 10 mg by mouth daily      cycloSPORINE (RESTASIS) 0.05 % ophthalmic emulsion Apply 1 drop to eye daily      ammonium lactate (AMLACTIN) 12 % cream Apply 12 each topically daily      Handicap Placard MISC 5 year handicap placard 1 each 0    cyclobenzaprine (FLEXERIL) 10 MG tablet 10 mg      gabapentin (NEURONTIN) 300 MG capsule 300 mg..      omeprazole (PRILOSEC) 20 MG delayed release capsule Take 20 mg by mouth daily       Current Facility-Administered Medications   Medication Dose Route Frequency Provider Last Rate Last Admin    lidocaine 1 % injection 4 mL  4 mL Intra-artICUlar Once Nida Cones, DO        lidocaine 1 % injection 4 mL  4 mL Intra-artICUlar Once Nida Cones, DO           Review of Systems   Constitutional: Negative. HENT: Negative. Respiratory: Negative. Gastrointestinal: Negative. Musculoskeletal:  Positive for back pain. Objective:  Vital signs: (most recent): not currently breastfeeding. Assessment & Plan  No diagnosis found. No orders of the defined types were placed in this encounter. No orders of the defined types were placed in this encounter.            Electronically signed by Ray Kowalski MA on 12/6/2022 at 8:38 AM

## 2022-12-06 NOTE — PROGRESS NOTES
The patient is a 80 y. o. Non- / non  female. Chief Complaint   Patient presents with    Back Pain        Patient is here today for: Back  Pain level: 4/10  Character: aching, sharp  Radiating: yes L leg  Weakness or numbness: no  Aggravating Factors: positions, stress  Alleviating Factors: stretches  Constant or intermitting: constant  Bladder/bowel loss: no        Past Medical History:   Diagnosis Date    Acid reflux     Arthritis     History of blood transfusion     Hyperlipidemia     Pneumonia involving left lung 03/10/2020        Past Surgical History:   Procedure Laterality Date    CARDIAC CATHETERIZATION  1999    CATARACT REMOVAL      [facility]    HIP SURGERY      galen GOPAL    JOINT REPLACEMENT      LUMBAR SPINE SURGERY Bilateral 8/24/2022    Percutaneous image guided lumbar decompression using MILD device from [employer] AT L3 / 4 AND L4-L5 performed by Uma Mota MD at [place] [address] History     Socioeconomic History    Marital status:      Spouse name: None    Number of children: None    Years of education: None    Highest education level: None   Tobacco Use    Smoking status: Never    Smokeless tobacco: Never   Vaping Use    Vaping Use: Never used   Substance and Sexual Activity    Alcohol use: No    Drug use: No       History reviewed. No pertinent family history.     No Known Allergies    Vitals:    12/06/22 0839   BP: 116/65   Pulse: 68   SpO2: 98%       Current Outpatient Medications   Medication Sig Dispense Refill    diclofenac sodium (VOLTAREN) 1 % GEL Apply 2 g topically 4 times daily as needed for Pain      alendronate (FOSAMAX) 70 MG tablet Take 70 mg by mouth every 7 days Takes Mondays      albuterol sulfate HFA (PROVENTIL;VENTOLIN;PROAIR) 108 (90 Base) MCG/ACT inhaler Inhale 2 puffs into the lungs every 6 hours as needed for Wheezing      vitamin C (ASCORBIC ACID) 500 MG tablet Take 1,000 mg by mouth daily      b complex vitamins capsule Take 1 capsule by mouth daily Cholecalciferol (VITAMIN D3) 125 MCG (5000 UT) TABS Take 1 tablet by mouth every other day      lipase-protease-amylase (CREON) 49329-944736 units CPEP delayed release capsule Take 180,000 Units by mouth daily      atorvastatin (LIPITOR) 10 MG tablet Take 10 mg by mouth daily      cycloSPORINE (RESTASIS) 0.05 % ophthalmic emulsion Apply 1 drop to eye daily      ammonium lactate (AMLACTIN) 12 % cream Apply 12 each topically daily      Handicap Placard MISC 5 year handicap placard 1 each 0    cyclobenzaprine (FLEXERIL) 10 MG tablet 10 mg      gabapentin (NEURONTIN) 300 MG capsule 300 mg..      omeprazole (PRILOSEC) 20 MG delayed release capsule Take 20 mg by mouth daily       Current Facility-Administered Medications   Medication Dose Route Frequency Provider Last Rate Last Admin    lidocaine 1 % injection 4 mL  4 mL Intra-artICUlar Once Birdena Spar, DO        lidocaine 1 % injection 4 mL  4 mL Intra-artICUlar Once Birdena Spar, DO           Review of Systems   Constitutional: Negative. Negative for fever. HENT: Negative. Respiratory: Negative. Gastrointestinal: Negative. Musculoskeletal:  Positive for back pain. Objective:  General Appearance: In no acute distress, in pain and uncomfortable. Vital signs: (most recent): Blood pressure 116/65, pulse 68, height 5' 4\" (1.626 m), weight 145 lb (65.8 kg), SpO2 98 %, not currently breastfeeding. Vital signs are normal.  No fever. Output: Producing urine and producing stool. Lungs:  Normal effort. She is not in respiratory distress. Heart: Normal rate. Neurological: Patient is alert and oriented to person, place and time. Assessment & Plan      1. Spinal stenosis of lumbar region with neurogenic claudication        Orders Placed This Encounter   Procedures    [place] NJX AA&/STRD TFRML EPI LUMBAR/SACRAL 1 LEVEL        No orders of the defined types were placed in this encounter.            Electronically signed by Yuliana Juarez MD on 12/6/2022 at 9:13 AM

## 2022-12-06 NOTE — PROGRESS NOTES
The patient is a 80 y. o. Non- / non  female. Chief Complaint   Patient presents with    Back Pain        Patient is here today for: Back  Left-sided in the lower lumbar area with radiation down left leg predominantly is some on the right side  Aggravated with standing and walking alleviates with sitting and rest client denied loss of bladder or bowel control  No progressive leg weakness  Diagnosed with lumbar spinal stenosis  Have done physical therapy in past  Of tried nonopioid medication  Systemic NSAIDs contraindicated because of renal risk associated with advanced age  Pain is interfering with quality of life and activity level  I will advise for lumbar epidural steroid injection    Pain level: 4/10  Character: aching, sharp  Radiating: yes L leg  Weakness or numbness: no  Aggravating Factors: positions, stress  Alleviating Factors: stretches  Constant or intermitting: constant  Bladder/bowel loss: no        Past Medical History:   Diagnosis Date    Acid reflux     Arthritis     History of blood transfusion     Hyperlipidemia     Pneumonia involving left lung 03/10/2020        Past Surgical History:   Procedure Laterality Date    CARDIAC CATHETERIZATION  1999    CATARACT REMOVAL      GALEN    HIP SURGERY      galen GOPAL    JOINT REPLACEMENT      LUMBAR SPINE SURGERY Bilateral 8/24/2022    Percutaneous image guided lumbar decompression using MILD device from [employer] AT L3 / 4 AND L4-L5 performed by Rocky Robbins MD at [place] [address] History     Socioeconomic History    Marital status:      Spouse name: None    Number of children: None    Years of education: None    Highest education level: None   Tobacco Use    Smoking status: Never    Smokeless tobacco: Never   Vaping Use    Vaping Use: Never used   Substance and Sexual Activity    Alcohol use: No    Drug use: No       History reviewed. No pertinent family history.     No Known Allergies    Vitals:    12/06/22 0839   BP: 116/65   Pulse: 68   SpO2: 98%       Current Outpatient Medications   Medication Sig Dispense Refill    diclofenac sodium (VOLTAREN) 1 % GEL Apply 2 g topically 4 times daily as needed for Pain      alendronate (FOSAMAX) 70 MG tablet Take 70 mg by mouth every 7 days Takes Mondays      albuterol sulfate HFA (PROVENTIL;VENTOLIN;PROAIR) 108 (90 Base) MCG/ACT inhaler Inhale 2 puffs into the lungs every 6 hours as needed for Wheezing      vitamin C (ASCORBIC ACID) 500 MG tablet Take 1,000 mg by mouth daily      b complex vitamins capsule Take 1 capsule by mouth daily      Cholecalciferol (VITAMIN D3) 125 MCG (5000 UT) TABS Take 1 tablet by mouth every other day      lipase-protease-amylase (CREON) 20122-688611 units CPEP delayed release capsule Take 180,000 Units by mouth daily      atorvastatin (LIPITOR) 10 MG tablet Take 10 mg by mouth daily      cycloSPORINE (RESTASIS) 0.05 % ophthalmic emulsion Apply 1 drop to eye daily      ammonium lactate (AMLACTIN) 12 % cream Apply 12 each topically daily      Handicap Placard MISC 5 year handicap placard 1 each 0    cyclobenzaprine (FLEXERIL) 10 MG tablet 10 mg      gabapentin (NEURONTIN) 300 MG capsule 300 mg..      omeprazole (PRILOSEC) 20 MG delayed release capsule Take 20 mg by mouth daily       Current Facility-Administered Medications   Medication Dose Route Frequency Provider Last Rate Last Admin    lidocaine 1 % injection 4 mL  4 mL Intra-artICUlar Once Swetha Ruizquet, DO        lidocaine 1 % injection 4 mL  4 mL Intra-artICUlar Once Swetha Sultana, DO           Review of Systems   Constitutional: Negative. Negative for fever. HENT: Negative. Respiratory: Negative. Gastrointestinal: Negative. Musculoskeletal:  Positive for back pain. Objective:  General Appearance:  Well-appearing, in no acute distress, uncomfortable and in pain.     Vital signs: (most recent): Blood pressure 116/65, pulse 68, height 5' 4\" (1.626 m), weight 145 lb (65.8 kg), SpO2 98 %, not currently breastfeeding. Vital signs are normal.  No fever. Output: Producing urine and producing stool. HEENT: Normal HEENT exam.    Lungs:  Normal effort and normal respiratory rate. She is not in respiratory distress. Heart: Normal rate. Extremities: Normal range of motion. There is no deformity. Neurological: Patient is alert and oriented to person, place and time. Patient has normal coordination. Pupils:  Pupils are equal, round, and reactive to light. Pupils are equal.   Skin:  Warm and dry. No rash or cyanosis. Assessment & Plan  Patient is here today for: Back  Left-sided in the lower lumbar area with radiation down left leg predominantly is some on the right side  Aggravated with standing and walking alleviates with sitting and rest client denied loss of bladder or bowel control  No progressive leg weakness  Diagnosed with lumbar spinal stenosis  Have done physical therapy in past  Of tried nonopioid medication  Systemic NSAIDs contraindicated because of renal risk associated with advanced age  Pain is interfering with quality of life and activity level  I will advise for lumbar epidural steroid injection    1. Spinal stenosis of lumbar region with neurogenic claudication        Orders Placed This Encounter   Procedures    [place] NJX AA&/STRD TFRML EPI LUMBAR/SACRAL 1 LEVEL      No orders of the defined types were placed in this encounter.            Electronically signed by Zonia Razo MD on 12/6/2022 at 9:20 AM

## 2023-01-11 ENCOUNTER — HOSPITAL ENCOUNTER (OUTPATIENT)
Dept: PAIN MANAGEMENT | Facility: CLINIC | Age: 83
Discharge: HOME | End: 2023-01-11
Payer: MEDICARE

## 2023-01-11 VITALS
OXYGEN SATURATION: 92 % | WEIGHT: 147 LBS | HEART RATE: 62 BPM | RESPIRATION RATE: 12 BRPM | TEMPERATURE: 97.2 F | SYSTOLIC BLOOD PRESSURE: 127 MMHG | DIASTOLIC BLOOD PRESSURE: 71 MMHG | BODY MASS INDEX: 25.1 KG/M2 | HEIGHT: 64 IN

## 2023-01-11 DIAGNOSIS — M48.062 SPINAL STENOSIS OF LUMBAR REGION WITH NEUROGENIC CLAUDICATION: ICD-10-CM

## 2023-01-11 DIAGNOSIS — M47.817 LUMBOSACRAL SPONDYLOSIS WITHOUT MYELOPATHY: Primary | ICD-10-CM

## 2023-01-11 DIAGNOSIS — R52 PAIN MANAGEMENT: ICD-10-CM

## 2023-01-11 PROCEDURE — 6360000004 HC RX CONTRAST MEDICATION: Performed by: ANESTHESIOLOGY

## 2023-01-11 PROCEDURE — 64483 NJX AA&/STRD TFRM EPI L/S 1: CPT

## 2023-01-11 PROCEDURE — 6360000002 HC RX W HCPCS: Performed by: ANESTHESIOLOGY

## 2023-01-11 PROCEDURE — 2500000003 HC RX 250 WO HCPCS: Performed by: ANESTHESIOLOGY

## 2023-01-11 RX ORDER — MIDAZOLAM HYDROCHLORIDE 2 MG/2ML
INJECTION, SOLUTION INTRAMUSCULAR; INTRAVENOUS
Status: COMPLETED | OUTPATIENT
Start: 2023-01-11 | End: 2023-01-11

## 2023-01-11 RX ORDER — DEXAMETHASONE SODIUM PHOSPHATE 10 MG/ML
INJECTION, SOLUTION INTRAMUSCULAR; INTRAVENOUS
Status: COMPLETED | OUTPATIENT
Start: 2023-01-11 | End: 2023-01-11

## 2023-01-11 RX ORDER — LIDOCAINE HYDROCHLORIDE 10 MG/ML
INJECTION, SOLUTION EPIDURAL; INFILTRATION; INTRACAUDAL; PERINEURAL
Status: COMPLETED | OUTPATIENT
Start: 2023-01-11 | End: 2023-01-11

## 2023-01-11 RX ORDER — FENTANYL CITRATE 50 UG/ML
INJECTION, SOLUTION INTRAMUSCULAR; INTRAVENOUS
Status: COMPLETED | OUTPATIENT
Start: 2023-01-11 | End: 2023-01-11

## 2023-01-11 RX ADMIN — LIDOCAINE HYDROCHLORIDE 2 ML: 10 INJECTION, SOLUTION EPIDURAL; INFILTRATION; INTRACAUDAL at 08:06

## 2023-01-11 RX ADMIN — MIDAZOLAM HYDROCHLORIDE 1 MG: 1 INJECTION, SOLUTION INTRAMUSCULAR; INTRAVENOUS at 08:06

## 2023-01-11 RX ADMIN — IOHEXOL 3 ML: 180 INJECTION INTRAVENOUS at 08:06

## 2023-01-11 RX ADMIN — DEXAMETHASONE SODIUM PHOSPHATE 10 MG: 10 INJECTION, SOLUTION INTRAMUSCULAR; INTRAVENOUS at 08:06

## 2023-01-11 RX ADMIN — FENTANYL CITRATE 50 MCG: 50 INJECTION, SOLUTION INTRAMUSCULAR; INTRAVENOUS at 08:06

## 2023-01-11 ASSESSMENT — PAIN - FUNCTIONAL ASSESSMENT
PAIN_FUNCTIONAL_ASSESSMENT: 0-10
PAIN_FUNCTIONAL_ASSESSMENT: PREVENTS OR INTERFERES WITH MANY ACTIVE NOT PASSIVE ACTIVITIES
PAIN_FUNCTIONAL_ASSESSMENT: 0-10

## 2023-01-11 ASSESSMENT — PAIN DESCRIPTION - DESCRIPTORS: DESCRIPTORS: SHARP;SPASM

## 2023-01-11 NOTE — H&P
Pain Pre-Op H&P Note    Edward Howard MD    HPI: Carloz Hu  presents with   Patient is here today for: Back  Left-sided in the lower lumbar area with radiation down left leg predominantly is some on the right side  Aggravated with standing and walking alleviates with sitting and rest client denied loss of bladder or bowel control  No progressive leg weakness  Diagnosed with lumbar spinal stenosis  Have done physical therapy in past      Past Medical History:   Diagnosis Date    Acid reflux     Arthritis     History of blood transfusion     Hyperlipidemia     Pneumonia involving left lung 03/10/2020       Past Surgical History:   Procedure Laterality Date    CARDIAC CATHETERIZATION  1999    CATARACT REMOVAL      GALEN    HIP SURGERY      galen GOPAL    JOINT REPLACEMENT      LUMBAR SPINE SURGERY Bilateral 08/24/2022    Percutaneous image guided lumbar decompression using MILD device from [employer] AT L3 / 4 AND L4-L5 performed by Edward Howard MD at [facility]         History reviewed. No pertinent family history.     No Known Allergies      Current Outpatient Medications:     diclofenac sodium (VOLTAREN) 1 % GEL, Apply 2 g topically 4 times daily as needed for Pain, Disp: , Rfl:     alendronate (FOSAMAX) 70 MG tablet, Take 70 mg by mouth every 7 days Takes Mondays, Disp: , Rfl:     albuterol sulfate HFA (PROVENTIL;VENTOLIN;PROAIR) 108 (90 Base) MCG/ACT inhaler, Inhale 2 puffs into the lungs every 6 hours as needed for Wheezing, Disp: , Rfl:     vitamin C (ASCORBIC ACID) 500 MG tablet, Take 1,000 mg by mouth daily, Disp: , Rfl:     b complex vitamins capsule, Take 1 capsule by mouth daily, Disp: , Rfl:     Cholecalciferol (VITAMIN D3) 125 MCG (5000 UT) TABS, Take 1 tablet by mouth every other day, Disp: , Rfl:     lipase-protease-amylase (CREON) 31350-155300 units CPEP delayed release capsule, Take 180,000 Units by mouth daily, Disp: , Rfl:     atorvastatin (LIPITOR) 10 MG tablet, Take 10 mg by mouth daily, Disp: , Rfl:     cycloSPORINE (RESTASIS) 0.05 % ophthalmic emulsion, Apply 1 drop to eye daily, Disp: , Rfl:     ammonium lactate (AMLACTIN) 12 % cream, Apply 12 each topically daily, Disp: , Rfl:     Handicap Placard MISC, 5 year handicap placard, Disp: 1 each, Rfl: 0    cyclobenzaprine (FLEXERIL) 10 MG tablet, 10 mg, Disp: , Rfl:     gabapentin (NEURONTIN) 300 MG capsule, 300 mg.., Disp: , Rfl:     omeprazole (PRILOSEC) 20 MG delayed release capsule, Take 20 mg by mouth daily, Disp: , Rfl:     Current Facility-Administered Medications:     lidocaine 1 % injection 4 mL, 4 mL, Intra-artICUlar, Once, Wolf Daily, DO    lidocaine 1 % injection 4 mL, 4 mL, Intra-artICUlar, Once, Wolf Daily, DO    Social History     Tobacco Use    Smoking status: Never    Smokeless tobacco: Never   Substance Use Topics    Alcohol use: No       Review of Systems:   Focused review of systems was performed, and negative as pertinent to diagnosis, except as stated in HPI. Physical Exam  Constitutional:       Appearance: Normal appearance. Pulmonary:      Effort: Pulmonary effort is normal.   Neurological:      Mental Status: alert. Psychiatric:         Attention and Perception: Attention and perception normal.         Mood and Affect: Mood and affect normal.   Cardiovascular:      Rate: Normal rate. ASA: 3          Mallampati: 3       Patient's current physical status, medications, medical history, and HPI have been reviewed and updated as appropriate on this date: 01/11/23    Risk/Benefit(s): The risks, benefits, alternatives, and potential complications have been discussed with the patient/family and informed consent has been obtained for the procedure/sedation. Diagnosis:   1. Lumbosacral spondylosis without myelopathy    2.  Spinal stenosis of lumbar region with neurogenic claudication            Plan:   Lumbar kavon        Gelacio Steel MD

## 2023-01-11 NOTE — OP NOTE
Patient Name: Flaquita Santos   YOB: 1940  Room/Bed: Room/bed info not found  Medical Record Number: [id number]  Date: 1/11/2023       Preoperative Diagnosis:    1. Lumbosacral spondylosis without myelopathy    2. Spinal stenosis of lumbar region with neurogenic claudication          Postoperative diagnosis:   1. Lumbosacral spondylosis without myelopathy    2. Spinal stenosis of lumbar region with neurogenic claudication          Blood Loss: none    Procedure Performed:  Transforaminal lumbar epidural steroid injection at the levels of L4 AND L5 on the Left side under fluoroscopic guidance. Procedure: The Patient was seen in the preop area, chart was reviewed, informed consent was obtained. Patient was taken to procedure room and was placed in prone position. Vital signs were monitored through out the  Procedure. A time out was completed. The skin over the back was prepped and draped in sterile manner. The target point was marked in ipsilateral obliques just below the pedicle at the target levels. Skin and deep tissues were anesthetized with 1 % lidocaine. A 20-gauge, spinal needle was advanced  under fluoroscopy guidance in AP / Oblique and lateral views, such that the tip of the needle lies in the neuroforamina at about the 6 o'clock position under the pedicle of the target vertebra. This was confirmed with AP and lateral views of the fluoroscopy. Then after negative aspiration contrast dye Omnipaque-180 was injected with live fluoroscopy in AP views that showed  spread of the contrast in the epidural space  and no vascular runoff or intrathecal spread. Finally 3 ml of treatment solution containing 5 ml of  PF NS mixed with 1 ml of dexamethasone 10 mg / ml was injected. The needle was removed and a Band-Aid was placed over the needle  insertion site. The patient's vital signs remained stable and the patient tolerated the procedure well.         The same procedure was then repeated at left at L 5 level with same technique. The remaining 3 ml of treatment solution was injected at that. The total dose of steroid injected today was dexamethasone 10 mg. Electronically signed by Delmar Levine MD on 1/11/2023 at 8:29 AM    SEDATION NOTE:    ASA CLASSIFICATION  3  MP   CLASSIFICATION  3    Moderate intravenous conscious sedation was supervised by Dr. Ed Hubbard  The patient was independently monitored by a Registered Nurse assigned to the Procedure Room  Monitoring included automated blood pressure, continuous EKG, Capnography and continuous pulse oximetry. The detailed Conscious Record is permanently stored in the [place] [address].      The following is the conscious sedation record;  Start Time:  0803  End times:  0813  Duration:  10 MINUTES  MEDS GIVEN 1 MG VERSED AND 50 MCG FENTANYL

## 2023-01-11 NOTE — DISCHARGE INSTRUCTIONS
Discharge Instructions following Sedation or Anesthesia:  You have  received  a sedative/anesthetic therefore, you should not consume any alcoholic beverages for minimum of 12 hours. Do not drive or operate machinery for 24 hours. Do not sign legal documents for 24 hours. Dizziness, drowsiness, and unsteadiness may occur. Rest when need to. Increase diet as tolerated. Keep up on fluids if diet allows. General Instructions:  Do not take a tub bath for 72 hours after procedure (this includes hot tubs and swimming pools). You may shower, but avoid hot water to injection site. Avoid strenuous activity TODAY especially if you experience dizziness. Remove band-aid the next day. Wash off any residual iodine   Do not use heat, heating pad, or any other heating device over the injection site for 3 days after the procedure. If you experience pain after your procedure, you may continue with your current pain medication as prescribed. (DO NOT INCREASE YOUR PAIN MEDICATION WITHOUT TALKING TO DOCTOR)  Soreness and pain at injection site is common, may use ice to reduce soreness. What To Expect After A Steroid Injection  You should start to feel the effects of the steroid injection in about 48-72 hours  You may experience facial flushing, night sweats and irritability. Other common steroid related side effects are increased appetite, mood elevation, insomnia and fluid retention. These effects usually subside in a few days. Steroids used in epidural injections may cause muscle spasms for a few days. If you are diabetic, your blood sugar may be elevated after your procedure due to the steroids. You will need to monitor your blood sugar more closely while going through a series of injections (Check blood sugar at meals and bedtime for 5 days). You may require adjustment in your diabetic medications, contact your PCP office to discuss.     Call Tania Morris at [phone] if you experience:   Fever, chills or temperature over 100    Vomiting, Headache, persistent stiff neck, nausea, blurred vision   Difficulty in urinating or unable to urinate with 8 hours   Increase in weakness, numbness or loss of function   Increased redness, swelling or drainage at the injection site

## 2023-01-23 ENCOUNTER — OFFICE VISIT (OUTPATIENT)
Dept: PAIN MANAGEMENT | Age: 83
End: 2023-01-23
Payer: MEDICARE

## 2023-01-23 VITALS — HEIGHT: 64 IN | OXYGEN SATURATION: 99 % | WEIGHT: 147 LBS | HEART RATE: 78 BPM | BODY MASS INDEX: 25.1 KG/M2

## 2023-01-23 DIAGNOSIS — M47.817 LUMBOSACRAL SPONDYLOSIS WITHOUT MYELOPATHY: ICD-10-CM

## 2023-01-23 DIAGNOSIS — M48.062 SPINAL STENOSIS OF LUMBAR REGION WITH NEUROGENIC CLAUDICATION: Primary | ICD-10-CM

## 2023-01-23 PROCEDURE — G8427 DOCREV CUR MEDS BY ELIG CLIN: HCPCS | Performed by: NURSE PRACTITIONER

## 2023-01-23 PROCEDURE — 1090F PRES/ABSN URINE INCON ASSESS: CPT | Performed by: NURSE PRACTITIONER

## 2023-01-23 PROCEDURE — 99213 OFFICE O/P EST LOW 20 MIN: CPT | Performed by: NURSE PRACTITIONER

## 2023-01-23 PROCEDURE — G8484 FLU IMMUNIZE NO ADMIN: HCPCS | Performed by: NURSE PRACTITIONER

## 2023-01-23 PROCEDURE — G8400 PT W/DXA NO RESULTS DOC: HCPCS | Performed by: NURSE PRACTITIONER

## 2023-01-23 PROCEDURE — G8417 CALC BMI ABV UP PARAM F/U: HCPCS | Performed by: NURSE PRACTITIONER

## 2023-01-23 PROCEDURE — 1036F TOBACCO NON-USER: CPT | Performed by: NURSE PRACTITIONER

## 2023-01-23 PROCEDURE — 1123F ACP DISCUSS/DSCN MKR DOCD: CPT | Performed by: NURSE PRACTITIONER

## 2023-01-23 ASSESSMENT — ENCOUNTER SYMPTOMS
GASTROINTESTINAL NEGATIVE: 1
SHORTNESS OF BREATH: 0
BACK PAIN: 1
RESPIRATORY NEGATIVE: 1
COUGH: 0
CONSTIPATION: 0

## 2023-01-23 ASSESSMENT — PATIENT HEALTH QUESTIONNAIRE - PHQ9: DEPRESSION UNABLE TO ASSESS: PT REFUSES

## 2023-01-23 NOTE — PROGRESS NOTES
Chief Complaint   Patient presents with    Follow Up After Procedure     Transforaminal lumbar epidural steroid injection at the levels of L4 AND L5 on the Left side under fluoroscopic guidance. Knee Pain     L       PMH     Left-sided in the lower lumbar area with radiation down left leg predominantly is some on the right side  Aggravated with standing and walking alleviates with sitting and rest client denied loss of bladder or bowel control  No progressive leg weakness  Diagnosed with lumbar spinal stenosis  Pt Had MILD procedure 8/22 done and still satisfied with results  Does report ongoing intermittent lumbar pain that is present when first OOB and when going from sit to stand position     Here today to f/u after left TFESI at L4 and L5 done and reports  100% relief of pain and improved activity tolerance      S/P: Transforaminal lumbar epidural steroid injection at the levels of L4 AND L5 on the Left side under fluoroscopic guidance. Outcome   Any improvement of activity?   Yes   Any side effects (appetite,leg cramping,facial fleshing):no   Increase of pain:  No  Pain score Today:  0  % of pain relief:100%  Pain diary (medial branch block): No    No results found for: LABA1C              Past Medical History:   Diagnosis Date    Acid reflux     Arthritis     History of blood transfusion     Hyperlipidemia     Pneumonia involving left lung 03/10/2020       Past Surgical History:   Procedure Laterality Date    CARDIAC CATHETERIZATION  1999    CATARACT REMOVAL      GALEN    HIP SURGERY      galen GOPAL    JOINT REPLACEMENT      LUMBAR SPINE SURGERY Bilateral 08/24/2022    Percutaneous image guided lumbar decompression using MILD device from [employer] AT L3 / 4 AND L4-L5 performed by Minda Massey MD at [facility]         No Known Allergies      Current Outpatient Medications:     diclofenac sodium (VOLTAREN) 1 % GEL, Apply 2 g topically 4 times daily as needed for Pain, Disp: 2 g, Rfl: 2    alendronate (FOSAMAX) 70 MG tablet, Take 70 mg by mouth every 7 days Takes Mondays, Disp: , Rfl:     albuterol sulfate HFA (PROVENTIL;VENTOLIN;PROAIR) 108 (90 Base) MCG/ACT inhaler, Inhale 2 puffs into the lungs every 6 hours as needed for Wheezing, Disp: , Rfl:     vitamin C (ASCORBIC ACID) 500 MG tablet, Take 1,000 mg by mouth daily, Disp: , Rfl:     b complex vitamins capsule, Take 1 capsule by mouth daily, Disp: , Rfl:     Cholecalciferol (VITAMIN D3) 125 MCG (5000 UT) TABS, Take 1 tablet by mouth every other day, Disp: , Rfl:     lipase-protease-amylase (CREON) 66256-823305 units CPEP delayed release capsule, Take 180,000 Units by mouth daily, Disp: , Rfl:     atorvastatin (LIPITOR) 10 MG tablet, Take 10 mg by mouth daily, Disp: , Rfl:     cycloSPORINE (RESTASIS) 0.05 % ophthalmic emulsion, Apply 1 drop to eye daily, Disp: , Rfl:     ammonium lactate (AMLACTIN) 12 % cream, Apply 12 each topically daily, Disp: , Rfl:     Handicap Placard MISC, 5 year handicap placard, Disp: 1 each, Rfl: 0    cyclobenzaprine (FLEXERIL) 10 MG tablet, 10 mg, Disp: , Rfl:     gabapentin (NEURONTIN) 300 MG capsule, 300 mg.., Disp: , Rfl:     omeprazole (PRILOSEC) 20 MG delayed release capsule, Take 20 mg by mouth daily, Disp: , Rfl:     No family history on file.     Social History     Socioeconomic History    Marital status:      Spouse name: Not on file    Number of children: Not on file    Years of education: Not on file    Highest education level: Not on file   Occupational History    Not on file   Tobacco Use    Smoking status: Never    Smokeless tobacco: Never   Vaping Use    Vaping Use: Never used   Substance and Sexual Activity    Alcohol use: No    Drug use: No    Sexual activity: Not on file   Other Topics Concern    Not on file   Social History Narrative    Not on file     Social Determinants of Health     Financial Resource Strain: Not on file   Food Insecurity: Not on file   Transportation Needs: Not on file   Physical Activity: Not on file   Stress: Not on file   Social Connections: Not on file   Intimate Partner Violence: Not on file   Housing Stability: Not on file       Review of Systems:  Review of Systems   Constitutional: Negative. Negative for chills and fever. HENT: Negative. Cardiovascular:  Negative for chest pain. Respiratory: Negative. Negative for cough and shortness of breath. Musculoskeletal:  Positive for back pain and joint pain. Gastrointestinal: Negative. Negative for constipation. Genitourinary: Negative. Physical Exam:  Pulse 78   Ht 5' 4\" (1.626 m)   Wt 147 lb (66.7 kg)   SpO2 99%   BMI 25.23 kg/m²     Physical Exam  Cardiovascular:      Rate and Rhythm: Normal rate. Pulmonary:      Effort: Pulmonary effort is normal.   Musculoskeletal:         General: Normal range of motion. Skin:     General: Skin is warm and dry. Neurological:      Mental Status: She is alert and oriented to person, place, and time. Assessment:  Problem List Items Addressed This Visit       Spinal stenosis of lumbar region with neurogenic claudication - Primary    Lumbosacral spondylosis without myelopathy          Treatment Plan:    Patient relates significant relief from recent intervention   Script written for diclofenac gel  Follow up appointment made for 3 months    I have reviewed the chief complaint and history of present illness (including ROS and [address]) and vital documentation by my staff and I agree with their documentation and have added where applicable.

## 2023-03-22 ENCOUNTER — TELEPHONE (OUTPATIENT)
Dept: ORTHOPEDIC SURGERY | Age: 83
End: 2023-03-22

## 2023-03-27 ENCOUNTER — TELEPHONE (OUTPATIENT)
Dept: ORTHOPEDIC SURGERY | Age: 83
End: 2023-03-27

## 2023-03-27 ENCOUNTER — OFFICE VISIT (OUTPATIENT)
Dept: ORTHOPEDIC SURGERY | Age: 83
End: 2023-03-27

## 2023-03-27 VITALS — HEIGHT: 64 IN | BODY MASS INDEX: 24.75 KG/M2 | RESPIRATION RATE: 16 BRPM | WEIGHT: 145 LBS

## 2023-03-27 DIAGNOSIS — M70.62 GREATER TROCHANTERIC BURSITIS OF LEFT HIP: Primary | ICD-10-CM

## 2023-03-27 RX ORDER — METHYLPREDNISOLONE ACETATE 80 MG/ML
80 INJECTION, SUSPENSION INTRA-ARTICULAR; INTRALESIONAL; INTRAMUSCULAR; SOFT TISSUE ONCE
Status: COMPLETED | OUTPATIENT
Start: 2023-03-27 | End: 2023-03-27

## 2023-03-27 RX ORDER — LIDOCAINE HYDROCHLORIDE 10 MG/ML
2 INJECTION, SOLUTION INFILTRATION; PERINEURAL ONCE
Status: COMPLETED | OUTPATIENT
Start: 2023-03-27 | End: 2023-03-27

## 2023-03-27 RX ADMIN — LIDOCAINE HYDROCHLORIDE 2 ML: 10 INJECTION, SOLUTION INFILTRATION; PERINEURAL at 14:18

## 2023-03-27 RX ADMIN — METHYLPREDNISOLONE ACETATE 80 MG: 80 INJECTION, SUSPENSION INTRA-ARTICULAR; INTRALESIONAL; INTRAMUSCULAR; SOFT TISSUE at 14:19

## 2023-03-27 ASSESSMENT — ENCOUNTER SYMPTOMS
CHEST TIGHTNESS: 0
NAUSEA: 0
APNEA: 0
ABDOMINAL PAIN: 0
COLOR CHANGE: 0
DIARRHEA: 0
SHORTNESS OF BREATH: 0
VOMITING: 0
COUGH: 0
RESPIRATORY NEGATIVE: 1
CONSTIPATION: 0
ABDOMINAL DISTENTION: 0

## 2023-03-27 NOTE — TELEPHONE ENCOUNTER
Patient of Monique Zhou, went to the ER after speaking with Claude Plank,    She was treated for the sciatia but is now having pain in L hip making it difficult to walk.  Please advise, [phone]

## 2023-03-27 NOTE — PROGRESS NOTES
[address] [place] [address] [facility]  [address] [place] [id number]  Dept: [phone]  Dept Fax: [phone]          Left hip Visit - Follow up    Subjective:     Chief Complaint   Patient presents with    Hip Pain     Left     HPI:     Blaire De La Torre presents today for left hip pain. Patient is here today for cortisone injections into left greater trochanteric bursa. She had her last cortisone injection was on 4/20/2022. She is seeing pain management for her low back and was in the emergency room for her back on 3/23/2023 at [facility].  She was given an intramuscular injection of Toradol and a lidocaine patch. Review of Systems   Constitutional:  Positive for activity change. Negative for appetite change, fatigue and fever. Respiratory: Negative. Negative for apnea, cough, chest tightness and shortness of breath. Cardiovascular: Negative. Negative for chest pain, palpitations and leg swelling. Gastrointestinal:  Negative for abdominal distention, abdominal pain, constipation, diarrhea, nausea and vomiting. Genitourinary:  Negative for difficulty urinating, dysuria and hematuria. Musculoskeletal:  Positive for arthralgias and gait problem. Negative for joint swelling and myalgias. Skin:  Negative for color change and rash. Neurological:  Negative for dizziness, weakness, numbness and headaches. Psychiatric/Behavioral:  Positive for sleep disturbance. I have reviewed the CC, and if not present in this note, I have reviewed in the patient's chart. I agree with the documentation provided by other staff and have reviewed their documentation prior to providing my signature indicating agreement. Vitals:   Resp 16   Ht 5' 4\" (1.626 m)   Wt 145 lb (65.8 kg)   BMI 24.89 kg/m²  Body mass index is 24.89 kg/m². Left Hip      GEN: Alert and oriented X 3, in no acute distress.   GAIT: The

## 2023-04-17 ENCOUNTER — OFFICE VISIT (OUTPATIENT)
Dept: PAIN MANAGEMENT | Age: 83
End: 2023-04-17
Payer: MEDICARE

## 2023-04-17 VITALS
HEIGHT: 64 IN | OXYGEN SATURATION: 99 % | HEART RATE: 73 BPM | DIASTOLIC BLOOD PRESSURE: 67 MMHG | BODY MASS INDEX: 24.75 KG/M2 | WEIGHT: 145 LBS | SYSTOLIC BLOOD PRESSURE: 131 MMHG

## 2023-04-17 DIAGNOSIS — M47.817 LUMBOSACRAL SPONDYLOSIS WITHOUT MYELOPATHY: ICD-10-CM

## 2023-04-17 DIAGNOSIS — M54.16 LUMBAR RADICULOPATHY: ICD-10-CM

## 2023-04-17 DIAGNOSIS — M48.062 SPINAL STENOSIS OF LUMBAR REGION WITH NEUROGENIC CLAUDICATION: Primary | ICD-10-CM

## 2023-04-17 PROCEDURE — 1123F ACP DISCUSS/DSCN MKR DOCD: CPT | Performed by: NURSE PRACTITIONER

## 2023-04-17 PROCEDURE — 1090F PRES/ABSN URINE INCON ASSESS: CPT | Performed by: NURSE PRACTITIONER

## 2023-04-17 PROCEDURE — G8427 DOCREV CUR MEDS BY ELIG CLIN: HCPCS | Performed by: NURSE PRACTITIONER

## 2023-04-17 PROCEDURE — 1036F TOBACCO NON-USER: CPT | Performed by: NURSE PRACTITIONER

## 2023-04-17 PROCEDURE — G8420 CALC BMI NORM PARAMETERS: HCPCS | Performed by: NURSE PRACTITIONER

## 2023-04-17 PROCEDURE — 99214 OFFICE O/P EST MOD 30 MIN: CPT | Performed by: NURSE PRACTITIONER

## 2023-04-17 PROCEDURE — G8400 PT W/DXA NO RESULTS DOC: HCPCS | Performed by: NURSE PRACTITIONER

## 2023-04-17 RX ORDER — LISINOPRIL 30 MG/1
TABLET ORAL
COMMUNITY

## 2023-04-17 RX ORDER — IBUPROFEN 800 MG/1
800 TABLET ORAL 3 TIMES DAILY
COMMUNITY
Start: 2023-03-23 | End: 2023-04-17 | Stop reason: ALTCHOICE

## 2023-04-17 RX ORDER — FLUTICASONE PROPIONATE 50 MCG
SPRAY, SUSPENSION (ML) NASAL
COMMUNITY
Start: 2022-01-20

## 2023-04-17 RX ORDER — GLYCERIN/MIN OIL/POLYCARBOPHIL
500 GEL WITH APPLICATOR (GRAM) VAGINAL EVERY 6 HOURS PRN
COMMUNITY
Start: 2023-03-23

## 2023-04-17 RX ORDER — ACETAMINOPHEN 500 MG
500 TABLET ORAL EVERY 6 HOURS PRN
COMMUNITY
Start: 2023-03-23

## 2023-04-17 RX ORDER — HYDROCODONE BITARTRATE AND ACETAMINOPHEN 5; 325 MG/1; MG/1
TABLET ORAL
COMMUNITY
Start: 2022-08-26 | End: 2023-04-17 | Stop reason: ALTCHOICE

## 2023-04-17 RX ORDER — TIZANIDINE 4 MG/1
4 TABLET ORAL NIGHTLY PRN
Qty: 30 TABLET | Refills: 0 | Status: SHIPPED | OUTPATIENT
Start: 2023-04-17

## 2023-04-17 RX ORDER — LIDOCAINE 50 MG/G
PATCH TOPICAL
COMMUNITY
Start: 2023-03-23 | End: 2023-04-17 | Stop reason: ALTCHOICE

## 2023-04-17 ASSESSMENT — ENCOUNTER SYMPTOMS
COUGH: 0
DIARRHEA: 0
VOMITING: 0
BACK PAIN: 1
SHORTNESS OF BREATH: 0
NAUSEA: 0
CONSTIPATION: 0

## 2023-04-17 NOTE — PROGRESS NOTES
tablet    acetaminophen (TYLENOL) 500 MG tablet    tiZANidine (ZANAFLEX) 4 MG tablet    Other Relevant Orders    [place] NJX AA&/STRD TFRML EPI LUMBAR/SACRAL 1 LEVEL (Completed)    Lumbar radiculopathy    Relevant Medications    tiZANidine (ZANAFLEX) 4 MG tablet    Other Relevant Orders    PA NJX AA&/STRD TFRML EPI LUMBAR/SACRAL 1 LEVEL (Completed)          Treatment Plan:    Script written for tizanidine   Left TFESI L4/5 ordered for worsening lumbar radicular pain    I have reviewed the chief complaint and history of present illness (including ROS and PFSH) and vital documentation by my staff and I agree with their documentation and have added where applicable.

## 2023-05-10 RX ORDER — TIZANIDINE 4 MG/1
4 TABLET ORAL NIGHTLY PRN
Qty: 30 TABLET | Refills: 0 | OUTPATIENT
Start: 2023-05-10

## 2023-05-17 ENCOUNTER — HOSPITAL ENCOUNTER (OUTPATIENT)
Dept: PAIN MANAGEMENT | Facility: CLINIC | Age: 83
Discharge: HOME | End: 2023-05-17
Payer: MEDICARE

## 2023-05-17 VITALS
HEIGHT: 64 IN | SYSTOLIC BLOOD PRESSURE: 146 MMHG | WEIGHT: 145 LBS | OXYGEN SATURATION: 95 % | BODY MASS INDEX: 24.75 KG/M2 | RESPIRATION RATE: 10 BRPM | HEART RATE: 57 BPM | TEMPERATURE: 97.2 F | DIASTOLIC BLOOD PRESSURE: 80 MMHG

## 2023-05-17 DIAGNOSIS — R52 PAIN MANAGEMENT: ICD-10-CM

## 2023-05-17 DIAGNOSIS — M48.062 SPINAL STENOSIS OF LUMBAR REGION WITH NEUROGENIC CLAUDICATION: Primary | ICD-10-CM

## 2023-05-17 PROCEDURE — 2500000003 HC RX 250 WO HCPCS: Performed by: ANESTHESIOLOGY

## 2023-05-17 PROCEDURE — 6360000002 HC RX W HCPCS: Performed by: ANESTHESIOLOGY

## 2023-05-17 PROCEDURE — 64483 NJX AA&/STRD TFRM EPI L/S 1: CPT

## 2023-05-17 PROCEDURE — 6360000004 HC RX CONTRAST MEDICATION: Performed by: ANESTHESIOLOGY

## 2023-05-17 RX ORDER — FENTANYL CITRATE 50 UG/ML
INJECTION, SOLUTION INTRAMUSCULAR; INTRAVENOUS
Status: COMPLETED | OUTPATIENT
Start: 2023-05-17 | End: 2023-05-17

## 2023-05-17 RX ORDER — MIDAZOLAM HYDROCHLORIDE 2 MG/2ML
INJECTION, SOLUTION INTRAMUSCULAR; INTRAVENOUS
Status: COMPLETED | OUTPATIENT
Start: 2023-05-17 | End: 2023-05-17

## 2023-05-17 RX ORDER — DEXAMETHASONE SODIUM PHOSPHATE 10 MG/ML
INJECTION, SOLUTION INTRAMUSCULAR; INTRAVENOUS
Status: COMPLETED | OUTPATIENT
Start: 2023-05-17 | End: 2023-05-17

## 2023-05-17 RX ORDER — LIDOCAINE HYDROCHLORIDE 10 MG/ML
INJECTION, SOLUTION EPIDURAL; INFILTRATION; INTRACAUDAL; PERINEURAL
Status: COMPLETED | OUTPATIENT
Start: 2023-05-17 | End: 2023-05-17

## 2023-05-17 RX ADMIN — LIDOCAINE HYDROCHLORIDE 5 ML: 10 INJECTION, SOLUTION EPIDURAL; INFILTRATION; INTRACAUDAL at 10:44

## 2023-05-17 RX ADMIN — MIDAZOLAM HYDROCHLORIDE 1 MG: 1 INJECTION, SOLUTION INTRAMUSCULAR; INTRAVENOUS at 10:51

## 2023-05-17 RX ADMIN — DEXAMETHASONE SODIUM PHOSPHATE 10 MG: 10 INJECTION, SOLUTION INTRAMUSCULAR; INTRAVENOUS at 10:46

## 2023-05-17 RX ADMIN — FENTANYL CITRATE 50 MCG: 50 INJECTION, SOLUTION INTRAMUSCULAR; INTRAVENOUS at 10:51

## 2023-05-17 RX ADMIN — MIDAZOLAM HYDROCHLORIDE 1 MG: 1 INJECTION, SOLUTION INTRAMUSCULAR; INTRAVENOUS at 10:44

## 2023-05-17 RX ADMIN — IOHEXOL 3 ML: 180 INJECTION INTRAVENOUS at 10:45

## 2023-05-17 RX ADMIN — FENTANYL CITRATE 50 MCG: 50 INJECTION, SOLUTION INTRAMUSCULAR; INTRAVENOUS at 10:44

## 2023-05-17 ASSESSMENT — PAIN - FUNCTIONAL ASSESSMENT
PAIN_FUNCTIONAL_ASSESSMENT: PREVENTS OR INTERFERES SOME ACTIVE ACTIVITIES AND ADLS
PAIN_FUNCTIONAL_ASSESSMENT: 0-10
PAIN_FUNCTIONAL_ASSESSMENT: 0-10

## 2023-05-17 ASSESSMENT — PAIN DESCRIPTION - DESCRIPTORS: DESCRIPTORS: SHARP;ACHING

## 2023-05-17 NOTE — H&P
UPDATE:  Office visit pain clinic in [facility] with all required elements of H&P dated 04/17/2023  Patient seen preop, chart reviewed,   No changes in medical history and health assessment since last evaluation. PE:  AAO x 3, in NAD, VSS,. Resp: breathing on RA, no respiratory distress  Cardiac: rate normal    Risk / Benefits explained to patient, patient agree to proceed with plan.   ASA 3  MP 3

## 2023-05-17 NOTE — OP NOTE
Patient Name: Ramandeep Adams   YOB: 1940  Room/Bed: Room/bed info not found  Medical Record Number: [id number]  Date: 5/17/2023       Preoperative Diagnosis:    1. Spinal stenosis of lumbar region with neurogenic claudication          Postoperative diagnosis:   1. Spinal stenosis of lumbar region with neurogenic claudication          Blood Loss: none    Procedure Performed:  Transforaminal lumbar epidural steroid injection at the levels of L4 on the Left side under fluoroscopic guidance. Procedure: The Patient was seen in the preop area, chart was reviewed, informed consent was obtained. Patient was taken to procedure room and was placed in prone position. Vital signs were monitored through out the  Procedure. A time out was completed. The skin over the back was prepped and draped in sterile manner. The target point was marked in ipsilateral obliques just below the pedicle at the target levels. Skin and deep tissues were anesthetized with 1 % lidocaine. A 20-gauge, spinal needle was advanced  under fluoroscopy guidance in AP / Oblique and lateral views, such that the tip of the needle lies in the neuroforamina at about the 6 o'clock position under the pedicle of the target vertebra. This was confirmed with AP and lateral views of the fluoroscopy. Then after negative aspiration contrast dye Omnipaque-180 was injected with live fluoroscopy in AP views that showed  spread of the contrast in the epidural space  and no vascular runoff or intrathecal spread. Finally 3 ml of treatment solution containing 5 ml of  PF NS mixed with 1 ml of dexamethasone 10 mg / ml was injected. The needle was removed and a Band-Aid was placed over the needle  insertion site. The patient's vital signs remained stable and the patient tolerated the procedure well.         Electronically signed by Jayesh Del Cid MD on 5/17/2023 at 2:52 PM    SEDATION NOTE:    ASA CLASSIFICATION  3  MP   CLASSIFICATION  3    Moderate

## 2023-05-17 NOTE — DISCHARGE INSTRUCTIONS

## 2023-05-26 ENCOUNTER — OFFICE VISIT (OUTPATIENT)
Dept: PAIN MANAGEMENT | Age: 83
End: 2023-05-26
Payer: MEDICARE

## 2023-05-26 VITALS — OXYGEN SATURATION: 99 % | HEART RATE: 63 BPM | WEIGHT: 145 LBS | HEIGHT: 64 IN | BODY MASS INDEX: 24.75 KG/M2

## 2023-05-26 DIAGNOSIS — M48.062 SPINAL STENOSIS OF LUMBAR REGION WITH NEUROGENIC CLAUDICATION: ICD-10-CM

## 2023-05-26 DIAGNOSIS — Z96.649 HIP PAIN ASSOCIATED WITH RECALLED TOTAL HIP ARTHROPLASTY HARDWARE, SUBSEQUENT ENCOUNTER: ICD-10-CM

## 2023-05-26 DIAGNOSIS — M47.817 LUMBOSACRAL SPONDYLOSIS WITHOUT MYELOPATHY: ICD-10-CM

## 2023-05-26 DIAGNOSIS — M54.16 LUMBAR RADICULOPATHY: Primary | ICD-10-CM

## 2023-05-26 DIAGNOSIS — T84.84XD HIP PAIN ASSOCIATED WITH RECALLED TOTAL HIP ARTHROPLASTY HARDWARE, SUBSEQUENT ENCOUNTER: ICD-10-CM

## 2023-05-26 PROCEDURE — 1123F ACP DISCUSS/DSCN MKR DOCD: CPT | Performed by: NURSE PRACTITIONER

## 2023-05-26 PROCEDURE — 1090F PRES/ABSN URINE INCON ASSESS: CPT | Performed by: NURSE PRACTITIONER

## 2023-05-26 PROCEDURE — 1036F TOBACCO NON-USER: CPT | Performed by: NURSE PRACTITIONER

## 2023-05-26 PROCEDURE — G8427 DOCREV CUR MEDS BY ELIG CLIN: HCPCS | Performed by: NURSE PRACTITIONER

## 2023-05-26 PROCEDURE — G8420 CALC BMI NORM PARAMETERS: HCPCS | Performed by: NURSE PRACTITIONER

## 2023-05-26 PROCEDURE — 99214 OFFICE O/P EST MOD 30 MIN: CPT | Performed by: NURSE PRACTITIONER

## 2023-05-26 PROCEDURE — G8400 PT W/DXA NO RESULTS DOC: HCPCS | Performed by: NURSE PRACTITIONER

## 2023-05-26 RX ORDER — GABAPENTIN 100 MG/1
100 CAPSULE ORAL 3 TIMES DAILY
Qty: 30 CAPSULE | Refills: 0 | Status: SHIPPED | OUTPATIENT
Start: 2023-05-26 | End: 2023-06-25

## 2023-05-26 ASSESSMENT — ENCOUNTER SYMPTOMS
CONSTIPATION: 0
BACK PAIN: 1
SHORTNESS OF BREATH: 0
RESPIRATORY NEGATIVE: 1
GASTROINTESTINAL NEGATIVE: 1
COUGH: 0

## 2023-05-26 NOTE — PROGRESS NOTES
Chief Complaint   Patient presents with    Follow-up     Transforaminal lumbar epidural steroid injection at the levels of L4 on the Left side under fluoroscopic guidance    ED 05/24/2023 R shoulder fall     PMH      Pt c/o intermittent chronic lumbar pain Left-sided in the lower lumbar area with radiation down left leg to her foot  MRI 7/2022 multilevel degenerative changes with associated facet and ligamentous hypertrophy with canal stenosis throughout the mid lumbar spine  Offered NS eval and pt declines  Pt had MILD procedure 8/22 done and still satisfied with results for her lumbar axial pain. Pt had left TFESI at L4 done 5/17/23 and reports 80% relief of lumbar pain and improved activity tolerance   Still having left hip to mid thigh to left foot pain with n/t to left foot. Pain is worse when sit to stand  Has seen Dr Roshni Gustafson in past with no relief from steroid injection. Last imaging 2019 will update and have her call ortho  Will start gabapentin nightly for radicular pain  Offered PT but declined - would like to see ortho first for options    HPI  Back Pain  This is a chronic problem. The current episode started more than 1 year ago. The problem occurs constantly. The problem is unchanged. The pain is present in the lumbar spine. The quality of the pain is described as aching. The pain does not radiate. The pain is at a severity of 1/10. The pain is mild. The pain is Worse during the day. The symptoms are aggravated by position and standing. Associated symptoms include numbness and paresthesias (lt foot). Pertinent negatives include no chest pain or fever. Risk factors include menopause. The treatment provided moderate relief. Hip Pain   The pain is present in the left hip and left thigh. The pain is at a severity of 9/10. The pain is severe. The pain has been Constant since onset. Associated symptoms include numbness. The symptoms are aggravated by movement, palpation and weight bearing.  She has

## 2023-06-22 ENCOUNTER — HOSPITAL ENCOUNTER (OUTPATIENT)
Dept: PHYSICAL THERAPY | Facility: CLINIC | Age: 83
Setting detail: THERAPIES SERIES
Discharge: HOME | End: 2023-06-22
Payer: MEDICARE

## 2023-06-22 NOTE — FLOWSHEET NOTE
[] [facility]) - [facility] &  Therapy  [address]    [phone]  F: [phone]   [x] [address]   [address]  P: [phone]  F: [phone]  [] [facility] &  Therapy  [address] [facility]  P: [phone]  F: [phone] [] [address] [employer]  P: [phone]  F: [phone]  [] [address]  [facility]   [address]   [place]: [phone]  F: [phone]   [] [place] [address] [facility] [address]  [place]: [phone]   F: [phone]     Physical Therapy Cancel/No Show note    Date: 2023  Patient: Johnnie Mendoza  : 1940  MRN: [id number]    Cancels/No Shows to date: 1/o    For today's appointment patient:    [x]  Cancelled    [] Rescheduled appointment    [] No-show     Reason given by patient:    []  Patient ill    []  Conflicting appointment    [] No transportation      [] Conflict with work    [] No reason given    [] Weather related    [] COVID-19    [x] Other:  no reason given    [x] Next appointment was confirmed    Electronically signed by: Jeanette Rice PT

## 2023-06-26 ENCOUNTER — OFFICE VISIT (OUTPATIENT)
Dept: PAIN MANAGEMENT | Age: 83
End: 2023-06-26
Payer: MEDICARE

## 2023-06-26 VITALS — WEIGHT: 145 LBS | HEART RATE: 57 BPM | BODY MASS INDEX: 24.75 KG/M2 | OXYGEN SATURATION: 98 % | HEIGHT: 64 IN

## 2023-06-26 DIAGNOSIS — M54.16 LUMBAR RADICULOPATHY: ICD-10-CM

## 2023-06-26 DIAGNOSIS — M48.062 SPINAL STENOSIS OF LUMBAR REGION WITH NEUROGENIC CLAUDICATION: ICD-10-CM

## 2023-06-26 DIAGNOSIS — M25.552 PAIN OF LEFT HIP: Primary | ICD-10-CM

## 2023-06-26 PROCEDURE — 1036F TOBACCO NON-USER: CPT | Performed by: NURSE PRACTITIONER

## 2023-06-26 PROCEDURE — 1090F PRES/ABSN URINE INCON ASSESS: CPT | Performed by: NURSE PRACTITIONER

## 2023-06-26 PROCEDURE — G8420 CALC BMI NORM PARAMETERS: HCPCS | Performed by: NURSE PRACTITIONER

## 2023-06-26 PROCEDURE — 1123F ACP DISCUSS/DSCN MKR DOCD: CPT | Performed by: NURSE PRACTITIONER

## 2023-06-26 PROCEDURE — G8400 PT W/DXA NO RESULTS DOC: HCPCS | Performed by: NURSE PRACTITIONER

## 2023-06-26 PROCEDURE — G8427 DOCREV CUR MEDS BY ELIG CLIN: HCPCS | Performed by: NURSE PRACTITIONER

## 2023-06-26 PROCEDURE — 99213 OFFICE O/P EST LOW 20 MIN: CPT | Performed by: NURSE PRACTITIONER

## 2023-06-26 RX ORDER — TIZANIDINE 4 MG/1
4 TABLET ORAL NIGHTLY PRN
Qty: 30 TABLET | Refills: 0 | Status: CANCELLED | OUTPATIENT
Start: 2023-06-26

## 2023-06-26 RX ORDER — GABAPENTIN 100 MG/1
200 CAPSULE ORAL 3 TIMES DAILY
Qty: 180 CAPSULE | Refills: 0 | Status: SHIPPED | OUTPATIENT
Start: 2023-06-26 | End: 2023-07-26

## 2023-06-27 ENCOUNTER — HOSPITAL ENCOUNTER (OUTPATIENT)
Dept: PHYSICAL THERAPY | Facility: CLINIC | Age: 83
Setting detail: THERAPIES SERIES
Discharge: HOME | End: 2023-06-27
Payer: MEDICARE

## 2023-06-27 PROCEDURE — 97110 THERAPEUTIC EXERCISES: CPT

## 2023-06-27 PROCEDURE — 97140 MANUAL THERAPY 1/> REGIONS: CPT

## 2023-06-29 ENCOUNTER — HOSPITAL ENCOUNTER (OUTPATIENT)
Dept: PHYSICAL THERAPY | Facility: CLINIC | Age: 83
Setting detail: THERAPIES SERIES
Discharge: HOME | End: 2023-06-29
Payer: MEDICARE

## 2023-06-29 PROCEDURE — G0283 ELEC STIM OTHER THAN WOUND: HCPCS

## 2023-06-29 PROCEDURE — 97140 MANUAL THERAPY 1/> REGIONS: CPT

## 2023-06-29 PROCEDURE — 97110 THERAPEUTIC EXERCISES: CPT

## 2023-07-03 ENCOUNTER — HOSPITAL ENCOUNTER (OUTPATIENT)
Dept: PHYSICAL THERAPY | Facility: CLINIC | Age: 83
Setting detail: THERAPIES SERIES
Discharge: HOME | End: 2023-07-03
Payer: MEDICARE

## 2023-07-03 PROCEDURE — 97110 THERAPEUTIC EXERCISES: CPT

## 2023-07-03 PROCEDURE — G0283 ELEC STIM OTHER THAN WOUND: HCPCS

## 2023-07-03 NOTE — FLOWSHEET NOTE
[] [address]  [address] [facility].  [phone]  F: [phone] [x] 204 [facility]  [address]   [address]  P: [phone]  F: [phone] [] [address]  151 [facility]  P: [phone]  F: [phone] [] [place]: [phone]  F: [phone] [] [address] [employer]  One [facility]   [address]   P: [phone]  F: [phone]  [] 7[address] [facility].   P: [phone]  F: [phone] [] 205 [facility]  [address]  P: [phone]  F: [phone] [] 224 [facility]  [address] [facility]  [place]: [phone]  F: [phone] [] 1 Medical [facility] [address]  [place]: [phone]  F: [phone]      Physical Therapy Daily Treatment Note    Date:  7/3/2023  Patient Name:  Debbie Almazan    :  1940  MRN: [id number]  Physician: Terrel Cushing, PA-C                                 Insurance: Medicare / Maame Minor ([facility])  Medical Diagnosis: M51.36 - Other intervertebral disc degeneration, lumbar region                       Rehab Codes: M54.42, M54.59, M25.552, M62.81, R26.89, R26.2  Onset date: 2022             Next 's appt.: 23 pain management     Visit# / total visits: ; Progress note for Medicare patient due at visit 10     Cancels/No Shows: 1/0  Frequency:  2 x/week for 12 visits    Subjective:    Pain:  [x] Yes  [] No Location:  L hip, L LE Pain Rating: (0-10 scale) 8/10, N/T (burning) all the way down to the L big

## 2023-07-05 ENCOUNTER — HOSPITAL ENCOUNTER (OUTPATIENT)
Dept: PHYSICAL THERAPY | Facility: CLINIC | Age: 83
Setting detail: THERAPIES SERIES
Discharge: HOME | End: 2023-07-05
Payer: MEDICARE

## 2023-07-05 PROCEDURE — 97110 THERAPEUTIC EXERCISES: CPT

## 2023-07-05 PROCEDURE — G0283 ELEC STIM OTHER THAN WOUND: HCPCS

## 2023-07-05 NOTE — FLOWSHEET NOTE
[] [address]  4600 [facility].  [phone]  F: [phone] [x] 204 [facility]  [address]   [address]  P: [phone]  F: [phone] [] [address]  151 [facility]  P: [phone]  F: [phone] [] [facility] [place]: [phone]  F: [phone] [] 224 [facility]  One [facility]   [address]   P: [phone]  F: [phone]  [] [address] [facility].   P: [phone]  F: [phone] [] 205 [facility]  [address] [place]    [address]  P: [phone]  F: [phone] [] 224 [facility]  7[address] [facility]  [place]: [phone]  F: [phone] [] 1 Medical [place] [facility] [address]  [place]: [phone]  F: [phone]      Physical Therapy Daily Treatment Note    Date:  2023  Patient Name:  Jo-Ann Joyner    :  1940  MRN: [id number]  Physician: Berkley Nunez PA-C                                 Insurance: Medicare / Latrell Sunshine ([facility])  Medical Diagnosis: M51.36 - Other intervertebral disc degeneration, lumbar region                       Rehab Codes: M54.42, M54.59, M25.552, M62.81, R26.89, R26.2  Onset date: 2022             Next 's appt.: 23 pain management     Visit# / total visits: ; Progress note for Medicare patient due at visit 10     Cancels/No Shows: 1/0  Frequency:  2 x/week for 12 visits    Subjective:    Pain:  [x] Yes  [] No Location:  L hip, L LE Pain Rating: (0-10 scale) 8/10, N/T (burning) all the way down to the L big

## 2023-07-11 ENCOUNTER — HOSPITAL ENCOUNTER (OUTPATIENT)
Dept: PHYSICAL THERAPY | Facility: CLINIC | Age: 83
Setting detail: THERAPIES SERIES
Discharge: HOME | End: 2023-07-11
Payer: MEDICARE

## 2023-07-11 NOTE — FLOWSHEET NOTE
[] [facility] - [facility] &  Therapy  [address] [facility].    [phone]  F: [phone]   [x] 204 [facility]  [address]  P: [phone]  F: [phone]  [] [address]  [address] [facility] [place] [facility] [address]  P: [phone]  F: [phone] [] [facility]  P: [phone]  F: [phone]  [] [address] [facility]   [address]   [place]: [phone]  F: [phone]   [] [facility]  [address] [facility]  [place]: [phone]   F: [phone]     Physical Therapy Cancel/No Show note    Date: 2023  Patient: Barbi Day  : 1940  MRN: [id number]    Cancels/No Shows to date: 0    For today's appointment patient:    [x]  Cancelled    [] Rescheduled appointment    [] No-show     Reason given by patient:    []  Patient ill    []  Conflicting appointment    [] No transportation      [] Conflict with work    [] No reason given    [] Weather related    [] COVID-19    [x] Other:      Comments:  death in the family; future appts canceled at this time; pt will call back when she is ready to return to PT      [] Next appointment was confirmed    Electronically signed by: Jenny Montero, PT

## 2023-07-13 ENCOUNTER — APPOINTMENT (OUTPATIENT)
Dept: PHYSICAL THERAPY | Facility: CLINIC | Age: 83
End: 2023-07-13
Payer: MEDICARE

## 2023-07-18 ENCOUNTER — APPOINTMENT (OUTPATIENT)
Dept: PHYSICAL THERAPY | Facility: CLINIC | Age: 83
End: 2023-07-18
Payer: MEDICARE

## 2023-07-31 ENCOUNTER — OFFICE VISIT (OUTPATIENT)
Dept: PAIN MANAGEMENT | Age: 83
End: 2023-07-31
Payer: MEDICARE

## 2023-07-31 VITALS — HEIGHT: 64 IN | HEART RATE: 61 BPM | WEIGHT: 145 LBS | BODY MASS INDEX: 24.75 KG/M2 | OXYGEN SATURATION: 97 %

## 2023-07-31 DIAGNOSIS — M48.062 SPINAL STENOSIS OF LUMBAR REGION WITH NEUROGENIC CLAUDICATION: ICD-10-CM

## 2023-07-31 DIAGNOSIS — M54.16 LUMBAR RADICULOPATHY: ICD-10-CM

## 2023-07-31 PROCEDURE — 1036F TOBACCO NON-USER: CPT | Performed by: NURSE PRACTITIONER

## 2023-07-31 PROCEDURE — 1090F PRES/ABSN URINE INCON ASSESS: CPT | Performed by: NURSE PRACTITIONER

## 2023-07-31 PROCEDURE — 1123F ACP DISCUSS/DSCN MKR DOCD: CPT | Performed by: NURSE PRACTITIONER

## 2023-07-31 PROCEDURE — G8420 CALC BMI NORM PARAMETERS: HCPCS | Performed by: NURSE PRACTITIONER

## 2023-07-31 PROCEDURE — 99213 OFFICE O/P EST LOW 20 MIN: CPT | Performed by: NURSE PRACTITIONER

## 2023-07-31 PROCEDURE — G8400 PT W/DXA NO RESULTS DOC: HCPCS | Performed by: NURSE PRACTITIONER

## 2023-07-31 PROCEDURE — G8427 DOCREV CUR MEDS BY ELIG CLIN: HCPCS | Performed by: NURSE PRACTITIONER

## 2023-07-31 RX ORDER — TIZANIDINE 4 MG/1
4 TABLET ORAL NIGHTLY PRN
Qty: 90 TABLET | Refills: 0 | Status: SHIPPED | OUTPATIENT
Start: 2023-07-31 | End: 2023-10-29

## 2023-07-31 RX ORDER — GABAPENTIN 100 MG/1
200 CAPSULE ORAL 3 TIMES DAILY
Qty: 540 CAPSULE | Refills: 0 | Status: SHIPPED | OUTPATIENT
Start: 2023-07-31 | End: 2023-10-29

## 2023-07-31 ASSESSMENT — ENCOUNTER SYMPTOMS
COUGH: 0
CONSTIPATION: 0
SHORTNESS OF BREATH: 0
RESPIRATORY NEGATIVE: 1
BACK PAIN: 1
GASTROINTESTINAL NEGATIVE: 1

## 2023-07-31 NOTE — PROGRESS NOTES
Chief Complaint   Patient presents with    Generalized Body Aches     Voltaren, zanaflex, gabapentin     PMH      Pt c/o intermittent chronic lumbar pain Left-sided in the lower lumbar area with radiation down left leg to her foot  MRI 7/2022 multilevel degenerative changes with associated facet and ligamentous hypertrophy with canal stenosis throughout the mid lumbar spine  Offered NS eval and pt declines  Pt had MILD procedure 8/22 done and still satisfied with results for her lumbar axial pain. Pt had left TFESI at L4 done 5/17/23 and reports 80% relief of lumbar pain and improved activity tolerance   Still having left hip to mid thigh to left foot pain with n/t to left foot. Pain is worse when sit to stand  Has seen Dr Parviz Fishman in past with no relief from steroid injection. Went to [facility] ortho and had xr and order for PT she has not been back for f/u but not interested in surgery  Pt sarted on gabapentin reports some relief and denies side effects - would like to continue     HPI  Back Pain  This is a chronic problem. The current episode started more than 1 year ago. The problem occurs constantly. The problem is unchanged. The pain is present in the lumbar spine. The quality of the pain is described as aching. The pain does not radiate. The pain is at a severity of 3/10. The pain is mild. The pain is Worse during the day. The symptoms are aggravated by position and standing. Associated symptoms include numbness and paresthesias (lt foot). Pertinent negatives include no chest pain or fever. Risk factors include menopause. The treatment provided moderate relief. Hip Pain   The pain is present in the left hip and left thigh. The pain is at a severity of 3/10. The pain is severe. The pain has been Constant since onset. Associated symptoms include numbness. The symptoms are aggravated by movement, palpation and weight bearing. She has tried ice, heat and acetaminophen for the symptoms.  The treatment

## 2023-08-04 ENCOUNTER — HOSPITAL ENCOUNTER (OUTPATIENT)
Dept: PHYSICAL THERAPY | Facility: CLINIC | Age: 83
Setting detail: THERAPIES SERIES
Discharge: HOME | End: 2023-08-04
Payer: MEDICARE

## 2023-08-04 ENCOUNTER — APPOINTMENT (OUTPATIENT)
Dept: PHYSICAL THERAPY | Facility: CLINIC | Age: 83
End: 2023-08-04
Payer: MEDICARE

## 2023-08-04 PROCEDURE — 97110 THERAPEUTIC EXERCISES: CPT

## 2023-08-04 NOTE — FLOWSHEET NOTE
[] [address]  4600 [facility] [place].  [phone]  F: [phone] [x] 204 [facility]  [address]   [address]  P: [phone]  F: [phone] [] [address]  151 [facility]  P: [phone]  F: [phone] [] [facility]: [phone]  F: [phone] [] [address]  One [facility]   [address]   P: [phone]  F: [phone]  [] 7[address] [facility].   P: [phone]  F: [phone] [] 205 [facility]  [address] [place]    [address]  P: [phone]  F: [phone] [] 224 [facility]  [address] [place] [address]  [place]: [phone]  F: [phone] [] [address] [place]  [address]  [place]: [phone]  F: [phone]      Physical Therapy Daily Treatment Note    Date:  2023  Patient Name:  Nirali Ortiz    :  1940  MRN: [id number]  Physician: Ria Benton PA-C                                 Insurance: Medicare / Jane Maldonado ([facility])  Medical Diagnosis: M51.36 - Other intervertebral disc degeneration, lumbar region                       Rehab Codes: M54.42, M54.59, M25.552, M62.81, R26.89, R26.2  Onset date: 2022             Next 's appt.: 23 pain management     Visit# / total visits: ; Progress note for Medicare patient due at visit 10     Cancels/No Shows: 1/0  Frequency:  2 x/week for 12 visits    Subjective:    Pain:  [x] Yes  [] No Location:  L hip, L LE Pain Rating: (0-10 scale) 4/10, N/T (burning) all the way down to the L big

## 2023-08-08 ENCOUNTER — APPOINTMENT (OUTPATIENT)
Dept: PHYSICAL THERAPY | Facility: CLINIC | Age: 83
End: 2023-08-08
Payer: MEDICARE

## 2023-08-09 ENCOUNTER — HOSPITAL ENCOUNTER (OUTPATIENT)
Dept: PHYSICAL THERAPY | Facility: CLINIC | Age: 83
Setting detail: THERAPIES SERIES
Discharge: HOME | End: 2023-08-09
Payer: MEDICARE

## 2023-08-09 PROCEDURE — 97110 THERAPEUTIC EXERCISES: CPT

## 2023-08-09 NOTE — FLOWSHEET NOTE
tolerance to going to appointments and grocery shopping. - MET 8/9/2023, pt reports 5/10 pain max  ? ROM: Pt will increase lumbar extension to 15 degrees in order to improve standing tolerance and positioning to complete tasks around her home. - MET 7/5/2023  ? Strength: Pt will increase bilateral hip strength to 4+/5 globally for improved symmetry and control with prolonged standing activities. - MET 7/5/2023  ? Function: Pt will demonstrate improved functional activity tolerance as evident by an improved score on the Oswestry to <50% functional impairment. - MET 7/5/2023, currently 44% functional impairment (22/50)  Pt will demonstrate decreased risk of falls and improved tolerance to getting up from sitting and ambulation as evident by the ability to perform TUG in <25 seconds with least restrictive assistive device. - Progress made 7/5/2023, currently 32 seconds to perform  Patient to be independent with home exercise program as demonstrated by performance with correct form without cues. - MET 8/9/2023  Demonstrate Knowledge of fall prevention- issued 7/3  LTG: (to be met in 12 treatments)  Pt will report L hip pain of 4-5/10 average while performing standing daily tasks around her home in order to maintain independence. Pt will demonstrate improved weight bearing tolerance as evident by the ability to perform bilateral SL standing for 10 seconds with light upper extremity support. Pt will demonstrate improved tolerance to transfers as evident by the ability to perform sit to stand with minimal R weight shift and without increased L hip pain following transfers in order to decrease reliance on an assistive device in the morning. Pt will demonstrate ability to ambulate x100ft with least restrictive device and minimal gait deficits and gait speed of 0.9 m/s in order to improve safety with community ambulation.     Pt. Education:  [x] Yes  [] No  [x] Reviewed Prior HEP/Ed  Method of Education: [x] Verbal  []

## 2023-08-11 ENCOUNTER — HOSPITAL ENCOUNTER (OUTPATIENT)
Dept: PHYSICAL THERAPY | Facility: CLINIC | Age: 83
Setting detail: THERAPIES SERIES
Discharge: HOME | End: 2023-08-11
Payer: MEDICARE

## 2023-08-11 PROCEDURE — 97110 THERAPEUTIC EXERCISES: CPT

## 2023-08-11 NOTE — FLOWSHEET NOTE
[] [address]  4600 [facility].  [phone]  F: [phone] [x] 204 [facility]  [address]   [address]  P: [phone]  F: [phone] [] [address]  151 [facility]  P: [phone]  F: [phone] [] [place]: [phone]  F: [phone] [] 224 [facility]  One [facility]   [address]   P: [phone]  F: [phone]  [] [address] [facility].   P: [phone]  F: [phone] [] 205 [facility]  [address] [facility]   [address]  P: [phone]  F: [phone] [] 224 [facility]  [address] [facility]  [place]: [phone]  F: [phone] [] [address] [facility] [address]  [place]: [phone]  F: [phone]      Physical Therapy Daily Treatment Note    Date:  2023  Patient Name:  Davion Alcala    :  1940  MRN: [id number]  Physician: Tien Ho PA-C                                 Insurance: Medicare / Lorenza Amin ([facility])  Medical Diagnosis: M51.36 - Other intervertebral disc degeneration, lumbar region                       Rehab Codes: M54.42, M54.59, M25.552, M62.81, R26.89, R26.2  Onset date: 2022             Next 's appt.: 10/30/23 pain management  Visit# / total visits: ; Progress note for Medicare patient due at visit 10     Cancels/No Shows: 1/0  Frequency:  2 x/week for 12 visits        Subjective:    Pain:  [x] Yes  [] No  Location:  L hip  Pain Rating: (0-10 scale) 4/10   Pain altered Tx:  [x] No  [] Yes  Action:

## 2023-08-18 ENCOUNTER — HOSPITAL ENCOUNTER (OUTPATIENT)
Dept: PHYSICAL THERAPY | Facility: CLINIC | Age: 83
Setting detail: THERAPIES SERIES
Discharge: HOME | End: 2023-08-18
Payer: MEDICARE

## 2023-08-18 PROCEDURE — 97110 THERAPEUTIC EXERCISES: CPT

## 2023-08-18 NOTE — FLOWSHEET NOTE
[] [address]  4600 [facility].  [phone]  F: [phone] [x] 204 [facility]  [address]   [address]  P: [phone]  F: [phone] [] [address]  151 [facility]  P: [phone]  F: [phone] [] [place]: [phone]  F: [phone] [] 224 [facility]  One [facility]   [address]   P: [phone]  F: [phone]  [] [address] [facility].   P: [phone]  F: [phone] [] 205 [facility]  [address] [place]    [address]  P: [phone]  F: [phone] [] 224 [facility]  [address] [facility]  [place]: [phone]  F: [phone] [] [address] [facility] [address]  [place]: [phone]  F: [phone]      Physical Therapy Daily Treatment Note    Date:  2023  Patient Name:  Lina Camejo    :  1940  MRN: [id number]  Physician: Laymond Rinne, PA-C                                 Insurance: Medicare / Chuck Ellington ([facility])  Medical Diagnosis: 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

## 2023-08-21 ENCOUNTER — HOSPITAL ENCOUNTER (OUTPATIENT)
Dept: PHYSICAL THERAPY | Facility: CLINIC | Age: 83
Setting detail: THERAPIES SERIES
Discharge: HOME | End: 2023-08-21
Payer: MEDICARE

## 2023-08-21 PROCEDURE — 97110 THERAPEUTIC EXERCISES: CPT

## 2023-08-21 NOTE — FLOWSHEET NOTE
[] [address]  4600 [facility] [place] [facility].  [phone]  F: [phone] [x] 204 [facility]  [address]   [address]  P: [phone]  F: [phone] [] [address]  151 [facility]  P: [phone]  F: [phone] [] [place]: [phone]  F: [phone] [] 224 [facility]  One [facility]   [address]   P: [phone]  F: [phone]  [] [address] [facility].   P: [phone]  F: [phone] [] 205 [facility]  [address] [place]    [address]  P: [phone]  F: [phone] [] 224 [facility]  [address] [facility]  [place]: [phone]  F: [phone] [] 1 Medical [facility] [address]  [place]: [phone]  F: [phone]      Physical Therapy Daily Treatment Note    Date:  2023  Patient Name:  Lauren Ruby    :  1940  MRN: [id number]  Physician: Madison Cook PA-C                                 Insurance: Medicare / Juliet Hinson ([facility])  Medical Diagnosis: M51.36 - Other intervertebral disc degeneration, lumbar region                       Rehab Codes: M54.42, M54.59, M25.552, M62.81, R26.89, R26.2  Onset date: 2022             Next 's appt.: 10/30/23 pain management  Visit# / total visits: 10/12; Progress note for Medicare patient due at visit 10     Cancels/No Shows:   Frequency:  2 x/week for 12 visits        Subjective:    Pain:  [x] Yes  [] No  Location:  L LE  Pain Rating: (0-10 scale) 4/10   Pain altered Tx:  [x] No  [] Yes  Action:

## 2023-08-23 ENCOUNTER — HOSPITAL ENCOUNTER (OUTPATIENT)
Dept: PHYSICAL THERAPY | Facility: CLINIC | Age: 83
Setting detail: THERAPIES SERIES
Discharge: HOME | End: 2023-08-23
Payer: MEDICARE

## 2023-08-23 PROCEDURE — 97110 THERAPEUTIC EXERCISES: CPT

## 2023-08-23 NOTE — FLOWSHEET NOTE
Comments: Pt arrives without complaints of pain, she notes that she is a little sore but overall feeling good. Pt reports that she plans to start going to [employer] now that she is feeling better. Objective:  Modalities:  IFC low fx 30 Hz to L L4-S5 region to lateral 1/3 of buttock with MHP in prone. Intensity to tolerance x 10 min. - HELD 8/4  Vibratory STM (Hypervolt) of L lumbar and L piriformis x 5 min with small ball and intensity 2. - HELD 8/4  L1- L5 CPA grade 3 10\" x 5 each. Centralized to L SI during. NOT TODAY   Attempted RepEIL manually x 10 provokes LE but no worse. Manual RepFIL manually x 20 provokes, and slightly peripheralized to hip  Fannie rotation in flex mobilization produced no change x 5 bouts bilat. Side glides in standing both provokes. Symptoms 4/10 in standing, with return of sx to knee. Education. Cont prone and BASHIR at home as instructed. LLE symptoms returned in WB. RepEIS x 2 sets of 10 failed to centralized.   Precautions: fall risk, minimal tolerance to WB  Exercises:  Exercise: BILATERAL  Reps/ Time Weight/ Level Comments   SCIFIT 5 min  Lv 2          SUPINE      Bilateral   HS stretch, SKTC, Hip ER/IR, gastroc stretch 10 min   Passive by therapist    Marching with TrA  10xea      Hip add  10x5\" Ball     Hip abd  10x2 lime    SAQ 10x5\" ea  Added 8/9   Bridge  15x5s [address], Dr reps 8/9, Small range; added band 8/11; inc reps 8/18    Figure-4 stretch 2x30s   New 8/18-overpressure from clinician 8/21   HS stretch- 3x30s strap New 8/18 -2 way added 8/21         PRONE       BASHIR 4 min  First 2 min BASHIR position, 2 min prone lying 8/9   Glute sets 2x10                 SIDELYING      Clams 2x10 Lime  Added band 8/11   Hip abduction  10x  Lime  New 8/11          SEATED       LAQ  10x5\" A     Heel raises 2x10 6\" On step for calf stretch 8/11   Toe raises 2x10     Sit to stand 10x  With foam, 8/21   Step tap  20x ea  6\" New 8/11-light fingers hold; forward & lateral 8/18

## 2023-10-23 ENCOUNTER — OFFICE VISIT (OUTPATIENT)
Dept: PAIN MANAGEMENT | Age: 83
End: 2023-10-23
Payer: MEDICARE

## 2023-10-23 VITALS — HEART RATE: 64 BPM | WEIGHT: 145 LBS | OXYGEN SATURATION: 97 % | BODY MASS INDEX: 24.75 KG/M2 | HEIGHT: 64 IN

## 2023-10-23 DIAGNOSIS — M54.16 LUMBAR RADICULOPATHY: ICD-10-CM

## 2023-10-23 DIAGNOSIS — M48.062 SPINAL STENOSIS OF LUMBAR REGION WITH NEUROGENIC CLAUDICATION: ICD-10-CM

## 2023-10-23 PROCEDURE — G8420 CALC BMI NORM PARAMETERS: HCPCS | Performed by: NURSE PRACTITIONER

## 2023-10-23 PROCEDURE — 1123F ACP DISCUSS/DSCN MKR DOCD: CPT | Performed by: NURSE PRACTITIONER

## 2023-10-23 PROCEDURE — 1090F PRES/ABSN URINE INCON ASSESS: CPT | Performed by: NURSE PRACTITIONER

## 2023-10-23 PROCEDURE — 99213 OFFICE O/P EST LOW 20 MIN: CPT | Performed by: NURSE PRACTITIONER

## 2023-10-23 PROCEDURE — G8400 PT W/DXA NO RESULTS DOC: HCPCS | Performed by: NURSE PRACTITIONER

## 2023-10-23 PROCEDURE — G8427 DOCREV CUR MEDS BY ELIG CLIN: HCPCS | Performed by: NURSE PRACTITIONER

## 2023-10-23 PROCEDURE — G8484 FLU IMMUNIZE NO ADMIN: HCPCS | Performed by: NURSE PRACTITIONER

## 2023-10-23 PROCEDURE — 1036F TOBACCO NON-USER: CPT | Performed by: NURSE PRACTITIONER

## 2023-10-23 RX ORDER — GABAPENTIN 100 MG/1
200 CAPSULE ORAL 3 TIMES DAILY
Qty: 540 CAPSULE | Refills: 0 | Status: SHIPPED | OUTPATIENT
Start: 2023-10-23 | End: 2024-01-21

## 2023-10-23 RX ORDER — TIZANIDINE 4 MG/1
4 TABLET ORAL NIGHTLY PRN
Qty: 90 TABLET | Refills: 0 | Status: SHIPPED | OUTPATIENT
Start: 2023-10-23 | End: 2024-01-21

## 2023-10-23 ASSESSMENT — ENCOUNTER SYMPTOMS
GASTROINTESTINAL NEGATIVE: 1
COUGH: 0
CONSTIPATION: 0
SHORTNESS OF BREATH: 0
BACK PAIN: 1
RESPIRATORY NEGATIVE: 1

## 2023-10-23 NOTE — PROGRESS NOTES
tizanidine and voltarin   Follow up appointment made for 3 months    I have reviewed the chief complaint and history of present illness (including ROS and PFSH) and vital documentation by my staff and I agree with their documentation and have added where applicable.

## 2023-12-06 ENCOUNTER — OFFICE VISIT (OUTPATIENT)
Dept: ORTHOPEDIC SURGERY | Age: 83
End: 2023-12-06
Payer: MEDICARE

## 2023-12-06 VITALS — RESPIRATION RATE: 16 BRPM | BODY MASS INDEX: 25.1 KG/M2 | OXYGEN SATURATION: 98 % | WEIGHT: 147 LBS | HEIGHT: 64 IN

## 2023-12-06 DIAGNOSIS — Z96.643 HISTORY OF TOTAL HIP REPLACEMENT, BILATERAL: Primary | ICD-10-CM

## 2023-12-06 PROCEDURE — G8400 PT W/DXA NO RESULTS DOC: HCPCS | Performed by: PHYSICIAN ASSISTANT

## 2023-12-06 PROCEDURE — G8427 DOCREV CUR MEDS BY ELIG CLIN: HCPCS | Performed by: PHYSICIAN ASSISTANT

## 2023-12-06 PROCEDURE — 1036F TOBACCO NON-USER: CPT | Performed by: PHYSICIAN ASSISTANT

## 2023-12-06 PROCEDURE — 99213 OFFICE O/P EST LOW 20 MIN: CPT | Performed by: PHYSICIAN ASSISTANT

## 2023-12-06 PROCEDURE — G8417 CALC BMI ABV UP PARAM F/U: HCPCS | Performed by: PHYSICIAN ASSISTANT

## 2023-12-06 PROCEDURE — 1123F ACP DISCUSS/DSCN MKR DOCD: CPT | Performed by: PHYSICIAN ASSISTANT

## 2023-12-06 PROCEDURE — 1090F PRES/ABSN URINE INCON ASSESS: CPT | Performed by: PHYSICIAN ASSISTANT

## 2023-12-06 PROCEDURE — G8484 FLU IMMUNIZE NO ADMIN: HCPCS | Performed by: PHYSICIAN ASSISTANT

## 2023-12-06 ASSESSMENT — ENCOUNTER SYMPTOMS
COLOR CHANGE: 0
RESPIRATORY NEGATIVE: 1
APNEA: 0
DIARRHEA: 0
NAUSEA: 0
CONSTIPATION: 0
ABDOMINAL DISTENTION: 0
COUGH: 0
SHORTNESS OF BREATH: 0
ABDOMINAL PAIN: 0
CHEST TIGHTNESS: 0
VOMITING: 0

## 2023-12-06 NOTE — PROGRESS NOTES
[facility] SPOR[facility]ICINE  [address] [address]  Dept: [phone]  Dept Fax: [phone]        Ambulatory Follow Up-bilateral GOPAL yearly check      Subjective: Nirali Ortiz is a 80y.o. year old female who presents to our office today for routine followup regarding her   1. History of total hip replacement, bilateral    .    Chief Complaint   Patient presents with    Hip Pain     B Hip Pain LI 3/27/23(left)       Date of surgery: Bilateral total hip arthroplasty-2011 and 2015    HPI Nirali Ortiz  is a 80 y.o.  female who presents today in follow for a yearly check for her bilateral total hip arthroplasties. The patient did have her right hip replaced in 2011 and the left was replaced in 2015 by Dr. Vincent Sykes DO. .  The patient was last seen on 3/27/2023 and underwent treatment in the form of greater trochanteric bursa injection of the left hip. The patient has also been having significant issues with her low back and is seeing pain management but states she is feeling much better. Patient states her hips have been doing well. She is following the antibiotic protocol for dental procedures. Review of Systems   Constitutional:  Positive for activity change. Negative for appetite change, fatigue and fever. Respiratory: Negative. Negative for apnea, cough, chest tightness and shortness of breath. Cardiovascular: Negative. Negative for chest pain, palpitations and leg swelling. Gastrointestinal:  Negative for abdominal distention, abdominal pain, constipation, diarrhea, nausea and vomiting. Genitourinary:  Negative for difficulty urinating, dysuria and hematuria. Musculoskeletal:  Positive for gait problem. Negative for arthralgias, joint swelling and myalgias. Skin:  Negative for color change and rash. Neurological:  Negative for dizziness, weakness, numbness and headaches.

## 2024-01-22 ENCOUNTER — OFFICE VISIT (OUTPATIENT)
Dept: PAIN MANAGEMENT | Age: 84
End: 2024-01-22
Payer: MEDICARE

## 2024-01-22 VITALS — BODY MASS INDEX: 25.1 KG/M2 | WEIGHT: 147 LBS | HEIGHT: 64 IN

## 2024-01-22 DIAGNOSIS — M48.062 SPINAL STENOSIS OF LUMBAR REGION WITH NEUROGENIC CLAUDICATION: ICD-10-CM

## 2024-01-22 DIAGNOSIS — M54.16 LUMBAR RADICULOPATHY: ICD-10-CM

## 2024-01-22 PROCEDURE — 1036F TOBACCO NON-USER: CPT | Performed by: NURSE PRACTITIONER

## 2024-01-22 PROCEDURE — G8417 CALC BMI ABV UP PARAM F/U: HCPCS | Performed by: NURSE PRACTITIONER

## 2024-01-22 PROCEDURE — 99213 OFFICE O/P EST LOW 20 MIN: CPT | Performed by: NURSE PRACTITIONER

## 2024-01-22 PROCEDURE — G8400 PT W/DXA NO RESULTS DOC: HCPCS | Performed by: NURSE PRACTITIONER

## 2024-01-22 PROCEDURE — 1090F PRES/ABSN URINE INCON ASSESS: CPT | Performed by: NURSE PRACTITIONER

## 2024-01-22 PROCEDURE — G8427 DOCREV CUR MEDS BY ELIG CLIN: HCPCS | Performed by: NURSE PRACTITIONER

## 2024-01-22 PROCEDURE — G8484 FLU IMMUNIZE NO ADMIN: HCPCS | Performed by: NURSE PRACTITIONER

## 2024-01-22 PROCEDURE — 1123F ACP DISCUSS/DSCN MKR DOCD: CPT | Performed by: NURSE PRACTITIONER

## 2024-01-22 RX ORDER — TIZANIDINE 4 MG/1
4 TABLET ORAL NIGHTLY PRN
Qty: 90 TABLET | Refills: 0 | Status: SHIPPED | OUTPATIENT
Start: 2024-01-22 | End: 2024-04-21

## 2024-01-22 RX ORDER — GABAPENTIN 100 MG/1
200 CAPSULE ORAL 3 TIMES DAILY
Qty: 540 CAPSULE | Refills: 0 | Status: SHIPPED | OUTPATIENT
Start: 2024-01-22 | End: 2024-04-21

## 2024-01-22 ASSESSMENT — ENCOUNTER SYMPTOMS
EYES NEGATIVE: 1
BACK PAIN: 1
SHORTNESS OF BREATH: 0
COUGH: 0
GASTROINTESTINAL NEGATIVE: 1
CONSTIPATION: 0

## 2024-01-22 NOTE — PROGRESS NOTES
Social History Narrative    Not on file     Social Determinants of Health     Financial Resource Strain: Not on file   Food Insecurity: Not on file   Transportation Needs: Not on file   Physical Activity: Not on file   Stress: Not on file   Social Connections: Not on file   Intimate Partner Violence: Not on file   Housing Stability: Not on file       Review of Systems:  Review of Systems   Constitutional: Negative. Negative for chills and fever.   HENT: Negative.     Eyes: Negative.    Cardiovascular: Negative.  Negative for chest pain.   Respiratory:  Negative for cough and shortness of breath.    Musculoskeletal:  Positive for arthritis, back pain and joint pain.   Gastrointestinal: Negative.  Negative for constipation.   Genitourinary: Negative.    Neurological:  Positive for numbness (occ left foot numbness).       Physical Exam:  Ht 1.626 m (5' 4\")   Wt 66.7 kg (147 lb)   BMI 25.23 kg/m²     Physical Exam  Constitutional:       General: She is not in acute distress.     Appearance: Normal appearance.   Pulmonary:      Effort: Pulmonary effort is normal.   Musculoskeletal:         General: Normal range of motion.   Skin:     General: Skin is warm and dry.   Neurological:      Mental Status: She is alert and oriented to person, place, and time.   Psychiatric:         Mood and Affect: Mood normal.         Behavior: Behavior normal.           Assessment:  Problem List Items Addressed This Visit       Spinal stenosis of lumbar region with neurogenic claudication    Relevant Medications    gabapentin (NEURONTIN) 100 MG capsule    tiZANidine (ZANAFLEX) 4 MG tablet    Lumbar radiculopathy    Relevant Medications    gabapentin (NEURONTIN) 100 MG capsule    tiZANidine (ZANAFLEX) 4 MG tablet          Treatment Plan:  Patient relates current medications are helping the pain. Patient reports taking pain medications as prescribed, denies obtaining medications from different sources and denies use of illegal drugs.

## 2024-04-22 ENCOUNTER — OFFICE VISIT (OUTPATIENT)
Dept: PAIN MANAGEMENT | Age: 84
End: 2024-04-22
Payer: MEDICARE

## 2024-04-22 VITALS — WEIGHT: 147 LBS | OXYGEN SATURATION: 97 % | HEIGHT: 64 IN | BODY MASS INDEX: 25.1 KG/M2 | HEART RATE: 67 BPM

## 2024-04-22 DIAGNOSIS — M47.817 LUMBOSACRAL SPONDYLOSIS WITHOUT MYELOPATHY: ICD-10-CM

## 2024-04-22 DIAGNOSIS — M54.16 LUMBAR RADICULOPATHY: ICD-10-CM

## 2024-04-22 DIAGNOSIS — M48.062 SPINAL STENOSIS OF LUMBAR REGION WITH NEUROGENIC CLAUDICATION: Primary | ICD-10-CM

## 2024-04-22 PROCEDURE — 1090F PRES/ABSN URINE INCON ASSESS: CPT | Performed by: NURSE PRACTITIONER

## 2024-04-22 PROCEDURE — 1036F TOBACCO NON-USER: CPT | Performed by: NURSE PRACTITIONER

## 2024-04-22 PROCEDURE — G8417 CALC BMI ABV UP PARAM F/U: HCPCS | Performed by: NURSE PRACTITIONER

## 2024-04-22 PROCEDURE — G8427 DOCREV CUR MEDS BY ELIG CLIN: HCPCS | Performed by: NURSE PRACTITIONER

## 2024-04-22 PROCEDURE — 99213 OFFICE O/P EST LOW 20 MIN: CPT | Performed by: NURSE PRACTITIONER

## 2024-04-22 PROCEDURE — 1123F ACP DISCUSS/DSCN MKR DOCD: CPT | Performed by: NURSE PRACTITIONER

## 2024-04-22 PROCEDURE — G8400 PT W/DXA NO RESULTS DOC: HCPCS | Performed by: NURSE PRACTITIONER

## 2024-04-22 RX ORDER — GABAPENTIN 100 MG/1
200 CAPSULE ORAL 2 TIMES DAILY
Qty: 360 CAPSULE | Refills: 0 | Status: SHIPPED | OUTPATIENT
Start: 2024-04-22 | End: 2024-07-21

## 2024-04-22 RX ORDER — TIZANIDINE 4 MG/1
4 TABLET ORAL NIGHTLY PRN
Qty: 90 TABLET | Refills: 0 | Status: SHIPPED | OUTPATIENT
Start: 2024-04-22 | End: 2024-07-21

## 2024-04-22 ASSESSMENT — ENCOUNTER SYMPTOMS
RESPIRATORY NEGATIVE: 1
COUGH: 0
SHORTNESS OF BREATH: 0
BACK PAIN: 1
CONSTIPATION: 0
GASTROINTESTINAL NEGATIVE: 1

## 2024-04-22 NOTE — PROGRESS NOTES
Chief Complaint   Patient presents with    Generalized Body Aches       Voltaren, zanaflex, gabapentin             Medication Refill: Voltaren, Zanaflex, Gabapentin    PMH  Pt c/o intermittent chronic lumbar pain Left-sided in the lower lumbar area with occ radiation down left leg to her foot and left foot nuumbness  MRI 7/2022 multilevel degenerative changes with associated facet and ligamentous hypertrophy with canal stenosis throughout the mid lumbar spine  Offered NS eval and pt declines  Pt had MILD procedure 8/22 done and still satisfied with results for her lumbar axial pain.  Pt had left TFESI at L4 done 5/17/23 and reports 80% relief of lumbar pain and improved activity tolerance   Still having left hip to mid thigh to left foot pain with n/t to left foot. Pain is worse when sit to stand. Follow with ortho for injections  but not interested in surgery  Pt sarted on gabapentin reports some relief and denies side effects - would like to continue      HPI  Back Pain  This is a chronic problem. The current episode started more than 1 year ago. The problem occurs intermittently. The problem has been waxing and waning since onset. The pain is at a severity of 0/10. The patient is experiencing no pain. Pertinent negatives include no chest pain, fever, numbness or paresthesias.       Pain score Today:  0  Adverse effects (Constipation / Nausea / Sedation / sexual Dysfunction / others) : no  Mood: good  Sleep pattern and quality: good  Activity level: good    Last dose taken  04/22/24 PM  OARRS report reviewed today: yes  ER/Hospitalizations/PCP visit related to pain since last visit:no   Any legal problems e.g. DUI etc.:No  Satisfied with current management: Yes      No results found for: \"LABA1C\"    Past Medical History, Past Surgical History, Social History, Allergies and Medications reviewed and updated in EPIC as indicated    Family History reviewed and is noncontributory.             Past Medical

## 2024-10-18 ENCOUNTER — OFFICE VISIT (OUTPATIENT)
Dept: PAIN MANAGEMENT | Age: 84
End: 2024-10-18
Payer: MEDICARE

## 2024-10-18 VITALS — WEIGHT: 147 LBS | BODY MASS INDEX: 25.1 KG/M2 | HEIGHT: 64 IN

## 2024-10-18 DIAGNOSIS — M47.817 LUMBOSACRAL SPONDYLOSIS WITHOUT MYELOPATHY: Primary | ICD-10-CM

## 2024-10-18 DIAGNOSIS — M48.062 SPINAL STENOSIS OF LUMBAR REGION WITH NEUROGENIC CLAUDICATION: ICD-10-CM

## 2024-10-18 DIAGNOSIS — M54.16 LUMBAR RADICULOPATHY: ICD-10-CM

## 2024-10-18 PROCEDURE — G8400 PT W/DXA NO RESULTS DOC: HCPCS | Performed by: ANESTHESIOLOGY

## 2024-10-18 PROCEDURE — 1123F ACP DISCUSS/DSCN MKR DOCD: CPT | Performed by: ANESTHESIOLOGY

## 2024-10-18 PROCEDURE — 99213 OFFICE O/P EST LOW 20 MIN: CPT | Performed by: ANESTHESIOLOGY

## 2024-10-18 PROCEDURE — G8427 DOCREV CUR MEDS BY ELIG CLIN: HCPCS | Performed by: ANESTHESIOLOGY

## 2024-10-18 PROCEDURE — 1036F TOBACCO NON-USER: CPT | Performed by: ANESTHESIOLOGY

## 2024-10-18 PROCEDURE — G8484 FLU IMMUNIZE NO ADMIN: HCPCS | Performed by: ANESTHESIOLOGY

## 2024-10-18 PROCEDURE — G8417 CALC BMI ABV UP PARAM F/U: HCPCS | Performed by: ANESTHESIOLOGY

## 2024-10-18 PROCEDURE — 1090F PRES/ABSN URINE INCON ASSESS: CPT | Performed by: ANESTHESIOLOGY

## 2024-10-18 RX ORDER — GABAPENTIN 100 MG/1
200 CAPSULE ORAL 2 TIMES DAILY
Qty: 360 CAPSULE | Refills: 0 | Status: SHIPPED | OUTPATIENT
Start: 2024-10-18 | End: 2025-01-16

## 2024-10-18 RX ORDER — PANTOPRAZOLE SODIUM 40 MG/1
40 TABLET, DELAYED RELEASE ORAL DAILY
COMMUNITY
Start: 2024-09-11

## 2024-10-18 RX ORDER — TOPIRAMATE 25 MG/1
25 TABLET, FILM COATED ORAL DAILY
COMMUNITY
Start: 2024-09-20

## 2024-10-18 RX ORDER — BUSPIRONE HYDROCHLORIDE 7.5 MG/1
TABLET ORAL
COMMUNITY
Start: 2024-06-25

## 2024-10-18 ASSESSMENT — ENCOUNTER SYMPTOMS
BACK PAIN: 0
SINUS PRESSURE: 0

## 2024-10-18 NOTE — PROGRESS NOTES
SPINE SURGERY Bilateral 08/24/2022    Percutaneous image guided lumbar decompression using MILD device from [employer] AT L3 / 4 AND L4-L5 performed by Kamran Ramirez MD at [facility] OR    PAIN MANAGEMENT PROCEDURE         Social History     Socioeconomic History    Marital status:      Spouse name: None    Number of children: None    Years of education: None    Highest education level: None   Tobacco Use    Smoking status: Never    Smokeless tobacco: Never   Vaping Use    Vaping status: Never Used   Substance and Sexual Activity    Alcohol use: No    Drug use: No     Social Determinants of Health     Food Insecurity: No Food Insecurity (9/20/2024)    Received from [facility] [email or web address]    Hunger Screening     Within the past 12 months we worried whether our food would run out before we got money to buy more.: Never True     Within the past 12 months the food we bought just didn't last and we didn't have money to get more.: Never True       History reviewed. No pertinent family history.    Allergies   Allergen Reactions    Aspirin Other (See Comments)    Morphine Nausea Only and Other (See Comments)       There were no vitals filed for this visit.    Current Outpatient Medications   Medication Sig Dispense Refill    topiramate (TOPAMAX) 25 MG tablet Take 1 tablet by mouth daily      pantoprazole (PROTONIX) 40 MG tablet Take 1 tablet by mouth daily      busPIRone (BUSPAR) 7.5 MG tablet TAKE 1 TABLET (7.5 MG) BY MOUTH IN THE MORNING AND TAKE 1 TABLET AT BEDTIME      gabapentin (NEURONTIN) 100 MG capsule Take 2 capsules by mouth in the morning and 2 capsules in the evening. Do all this for 90 days. Intended supply: 30 days. 360 capsule 0    Handicap Placard MISC by Does not apply route EXP: 1/31/2028 For 5 years 1 each 0    Aspirin Effervescent 325 MG TBEF tablet Take by mouth daily      [employer] ACETAMINOPHEN EX  MG tablet Take 1 tablet by mouth every 6 hours as needed      acetaminophen (TYLENOL) 500 MG tablet Take

## 2024-12-06 DIAGNOSIS — Z96.643 HISTORY OF TOTAL HIP REPLACEMENT, BILATERAL: Primary | ICD-10-CM

## 2024-12-06 NOTE — PATIENT INSTRUCTIONS
CORTISONE INJECTION CARE    The injection site should never get red, hot, or swollen and if it does the patient will contact our office right away. The patient may experience a increase in soreness the first 24-48 hours due to a cortisone flair and can take anti-inflammatories for a short period of time to reduce that soreness. The patient should not submerge the injection site in water for a minimum of 24 hours to avoid infection. This means no lakes, pools, ponds, or hot tubs for 24 hours. If the patient is diabetic the injection may increase their blood sugar for up to one week. The patient can do this cortisone injection once every 4 months as needed.                                                                                                                                                                                                                                                                                                                PATIENTIQ:  PatientIQ helps [facility] stay in touch with you to know how you're feeling, and provides education and care instructions to you at various time points.   Your answers help your care team track your progress to provide the best care possible. PatientIQ will contact you pre-op and post-op via email or text with:  Educational Videos and Care Instructions  Questionnaires About How You're Feeling    Your participation provides you valuable education and helps [facility] continue to provide quality care to all patients. Thank you

## 2024-12-07 DIAGNOSIS — Z96.643 HISTORY OF TOTAL HIP REPLACEMENT, BILATERAL: Primary | ICD-10-CM

## 2024-12-07 NOTE — PROGRESS NOTES
[facility] AND [facility] MEDICINE  [address]  [place] [address]  Dept: [phone]  Dept Fax: [phone]        Ambulatory Follow Up      Subjective:   Melinda Baca is a 84 y.o. year old female who presents to our office today for routine followup regarding her   1. Greater trochanteric bursitis of left hip    2. History of total hip replacement, bilateral    .    Chief Complaint   Patient presents with    Hip Pain     Bilateral hip pain- GOPAL -Annual check           History of Present Illness  The patient is here today for her annual check-up following bilateral total hip arthroplasties. The surgeries were performed in 2011 and 2015 by Dr. Casey Douglas.    She reports no significant changes since her last visit, with her hips generally in good condition. However, she experiences morning stiffness and mild pain in her left hip. She received an injection in 2023, which provided temporary relief. Her pain returned at the start of 2024 and has remained consistent since then. She takes Aleve three times a week, depending on her activity level. She finds it difficult to rise after sitting for extended periods and uses the steering wheel for support during long drives. She occasionally rolls over onto her left side during sleep.    She continues to take antibiotics when visiting the dentist. She has lost some weight unintentionally, dropping from 147 pounds last year to 140 pounds currently. Her back is in good condition. She uses a walker for mobility, particularly in the morning and after her bus ride.    She has no known allergies to iodine.      Objective :   Resp 15   Ht 1.626 m (5' 4\")   Wt 63.5 kg (140 lb)   SpO2 99%   BMI 24.03 kg/m²  Body mass index is 24.03 kg/m².  General: Melinda Baca is a 84 y.o. female who is alert and oriented and sitting comfortably in our office.    Resp 15   Ht 1.626 m (5' 4\")

## 2024-12-09 ENCOUNTER — OFFICE VISIT (OUTPATIENT)
Dept: ORTHOPEDIC SURGERY | Age: 84
End: 2024-12-09
Payer: MEDICARE

## 2024-12-09 VITALS — BODY MASS INDEX: 23.9 KG/M2 | HEIGHT: 64 IN | OXYGEN SATURATION: 99 % | WEIGHT: 140 LBS | RESPIRATION RATE: 15 BRPM

## 2024-12-09 DIAGNOSIS — M70.62 GREATER TROCHANTERIC BURSITIS OF LEFT HIP: Primary | ICD-10-CM

## 2024-12-09 DIAGNOSIS — Z96.643 HISTORY OF TOTAL HIP REPLACEMENT, BILATERAL: Primary | ICD-10-CM

## 2024-12-09 DIAGNOSIS — Z96.643 HISTORY OF TOTAL HIP REPLACEMENT, BILATERAL: ICD-10-CM

## 2024-12-09 PROCEDURE — G8420 CALC BMI NORM PARAMETERS: HCPCS | Performed by: PHYSICIAN ASSISTANT

## 2024-12-09 PROCEDURE — G8400 PT W/DXA NO RESULTS DOC: HCPCS | Performed by: PHYSICIAN ASSISTANT

## 2024-12-09 PROCEDURE — G8427 DOCREV CUR MEDS BY ELIG CLIN: HCPCS | Performed by: PHYSICIAN ASSISTANT

## 2024-12-09 PROCEDURE — 1036F TOBACCO NON-USER: CPT | Performed by: PHYSICIAN ASSISTANT

## 2024-12-09 PROCEDURE — 20611 DRAIN/INJ JOINT/BURSA W/US: CPT | Performed by: PHYSICIAN ASSISTANT

## 2024-12-09 PROCEDURE — 1090F PRES/ABSN URINE INCON ASSESS: CPT | Performed by: PHYSICIAN ASSISTANT

## 2024-12-09 PROCEDURE — G8484 FLU IMMUNIZE NO ADMIN: HCPCS | Performed by: PHYSICIAN ASSISTANT

## 2024-12-09 PROCEDURE — 99213 OFFICE O/P EST LOW 20 MIN: CPT | Performed by: PHYSICIAN ASSISTANT

## 2024-12-09 PROCEDURE — 1159F MED LIST DOCD IN RCRD: CPT | Performed by: PHYSICIAN ASSISTANT

## 2024-12-09 PROCEDURE — 1123F ACP DISCUSS/DSCN MKR DOCD: CPT | Performed by: PHYSICIAN ASSISTANT

## 2024-12-09 PROCEDURE — 1125F AMNT PAIN NOTED PAIN PRSNT: CPT | Performed by: PHYSICIAN ASSISTANT

## 2024-12-09 RX ORDER — METHYLPREDNISOLONE ACETATE 80 MG/ML
80 INJECTION, SUSPENSION INTRA-ARTICULAR; INTRALESIONAL; INTRAMUSCULAR; SOFT TISSUE ONCE
Status: COMPLETED | OUTPATIENT
Start: 2024-12-09 | End: 2024-12-09

## 2024-12-09 RX ORDER — LIDOCAINE HYDROCHLORIDE 10 MG/ML
2 INJECTION, SOLUTION INFILTRATION; PERINEURAL ONCE
Status: COMPLETED | OUTPATIENT
Start: 2024-12-09 | End: 2024-12-09

## 2024-12-09 RX ADMIN — LIDOCAINE HYDROCHLORIDE 2 ML: 10 INJECTION, SOLUTION INFILTRATION; PERINEURAL at 13:48

## 2024-12-09 RX ADMIN — METHYLPREDNISOLONE ACETATE 80 MG: 80 INJECTION, SUSPENSION INTRA-ARTICULAR; INTRALESIONAL; INTRAMUSCULAR; SOFT TISSUE at 13:49

## 2025-01-24 ENCOUNTER — TELEPHONE (OUTPATIENT)
Dept: PAIN MANAGEMENT | Age: 85
End: 2025-01-24

## 2025-01-24 NOTE — TELEPHONE ENCOUNTER
Patient called stating she is experiencing pretty severe pain across her back, down left leg to her foot and is asking to be seen as soon as possible. She states she was not aware she had a scheduled appointment on 1/7/25 that she missed.  She states every morning she gets up she has to use a walker, takes an Aleve and by the afternoon she is able to use her cane. She is asking for relief and to be seen prior to what I'm able to office at the end of February.    Please return her call to discuss.    Thank you.

## 2025-01-24 NOTE — TELEPHONE ENCOUNTER
Called patient and went over available appointment times, she was scheduled for 02/28/25. Patient advised if anything opens prior, that we would try to move her up. Patient acknowledged.

## 2025-02-28 ENCOUNTER — OFFICE VISIT (OUTPATIENT)
Dept: PAIN MANAGEMENT | Age: 85
End: 2025-02-28
Payer: MEDICARE

## 2025-02-28 VITALS — HEIGHT: 64 IN | WEIGHT: 140 LBS | BODY MASS INDEX: 23.9 KG/M2

## 2025-02-28 DIAGNOSIS — M54.16 LUMBAR RADICULOPATHY: ICD-10-CM

## 2025-02-28 DIAGNOSIS — M48.062 SPINAL STENOSIS OF LUMBAR REGION WITH NEUROGENIC CLAUDICATION: ICD-10-CM

## 2025-02-28 DIAGNOSIS — M47.817 LUMBOSACRAL SPONDYLOSIS WITHOUT MYELOPATHY: Primary | ICD-10-CM

## 2025-02-28 PROCEDURE — 1123F ACP DISCUSS/DSCN MKR DOCD: CPT | Performed by: ANESTHESIOLOGY

## 2025-02-28 PROCEDURE — G8427 DOCREV CUR MEDS BY ELIG CLIN: HCPCS | Performed by: ANESTHESIOLOGY

## 2025-02-28 PROCEDURE — 1090F PRES/ABSN URINE INCON ASSESS: CPT | Performed by: ANESTHESIOLOGY

## 2025-02-28 PROCEDURE — 1036F TOBACCO NON-USER: CPT | Performed by: ANESTHESIOLOGY

## 2025-02-28 PROCEDURE — 1159F MED LIST DOCD IN RCRD: CPT | Performed by: ANESTHESIOLOGY

## 2025-02-28 PROCEDURE — G8400 PT W/DXA NO RESULTS DOC: HCPCS | Performed by: ANESTHESIOLOGY

## 2025-02-28 PROCEDURE — G8420 CALC BMI NORM PARAMETERS: HCPCS | Performed by: ANESTHESIOLOGY

## 2025-02-28 PROCEDURE — 1160F RVW MEDS BY RX/DR IN RCRD: CPT | Performed by: ANESTHESIOLOGY

## 2025-02-28 PROCEDURE — 1125F AMNT PAIN NOTED PAIN PRSNT: CPT | Performed by: ANESTHESIOLOGY

## 2025-02-28 PROCEDURE — 99213 OFFICE O/P EST LOW 20 MIN: CPT | Performed by: ANESTHESIOLOGY

## 2025-02-28 RX ORDER — GABAPENTIN 100 MG/1
200 CAPSULE ORAL 2 TIMES DAILY
Qty: 360 CAPSULE | Refills: 0 | Status: SHIPPED | OUTPATIENT
Start: 2025-02-28 | End: 2025-05-29

## 2025-02-28 ASSESSMENT — ENCOUNTER SYMPTOMS
RESPIRATORY NEGATIVE: 1
GASTROINTESTINAL NEGATIVE: 1

## 2025-02-28 NOTE — PROGRESS NOTES
The patient is a 84 y.o. /  female.    Chief Complaint   Patient presents with    Generalized Body Aches    Medication Refill     Voltaren, zanaflex, gabapentin        Chief Complaint: generalized Body aches  Medication Refill: Voltaren, zanaflex, gabapentin     Pt c/o intermittent chronic lumbar pain Left-sided in the lower lumbar area with occ radiation down left leg to her foot and left foot nuumbness  MRI 7/2022 multilevel degenerative changes with associated facet and ligamentous hypertrophy with canal stenosis throughout the mid lumbar spine  Offered NS eval and pt declines  Pt had MILD procedure 8/22 done and still satisfied with results for her lumbar axial pain.  Pt had left TFESI at L4 done 5/17/23 and reports 80% relief of lumbar pain and improved activity tolerance   Still having left hip to mid thigh to left foot pain with n/t to left foot. Pain is worse when sit to stand. Follow with ortho for injections  but not interested in surgery  Pt sarted on gabapentin reports some relief and denies side effects - would like to continue      Pain score Today:  4  Adverse effects (Constipation / Nausea / Sedation / sexual Dysfunction / others) : no  Mood: excellent  Sleep pattern and quality: good  Activity level: fair    Pill count Today: -   Last dose taken  Gabapentin- 2/27/25 PM   OARRS report reviewed today: yes  ER/Hospitalizations/PCP visit related to pain since last visit:no   Any legal problems e.g. DUI etc.:No  Satisfied with current management: Yes    Opioid Contract:  Last Urine Dug screen dated:    No results found for: \"LABA1C\"    Past Medical History, Past Surgical History, Social History, Allergies and Medications reviewed and updated in EPIC as indicated    Family History reviewed and is noncontributory.           Past Medical History:   Diagnosis Date    Acid reflux     Arthritis     History of blood transfusion     Hyperlipidemia     Pneumonia involving left lung 03/10/2020

## 2025-03-18 ENCOUNTER — TELEPHONE (OUTPATIENT)
Dept: ADMINISTRATIVE | Age: 85
End: 2025-03-18

## 2025-03-18 DIAGNOSIS — M48.062 SPINAL STENOSIS OF LUMBAR REGION WITH NEUROGENIC CLAUDICATION: Primary | ICD-10-CM

## 2025-03-18 DIAGNOSIS — M54.16 LUMBAR RADICULOPATHY: ICD-10-CM

## 2025-03-18 DIAGNOSIS — M47.817 LUMBOSACRAL SPONDYLOSIS WITHOUT MYELOPATHY: ICD-10-CM

## 2025-03-18 NOTE — TELEPHONE ENCOUNTER
Please schedule for lumbar epidural steroid injection  Check with patient if she is on any blood thinner

## 2025-04-09 ENCOUNTER — HOSPITAL ENCOUNTER (OUTPATIENT)
Dept: PAIN MANAGEMENT | Facility: CLINIC | Age: 85
Discharge: HOME | End: 2025-04-09
Payer: MEDICARE

## 2025-04-09 VITALS
HEIGHT: 64 IN | WEIGHT: 140 LBS | RESPIRATION RATE: 9 BRPM | SYSTOLIC BLOOD PRESSURE: 128 MMHG | BODY MASS INDEX: 23.9 KG/M2 | OXYGEN SATURATION: 93 % | DIASTOLIC BLOOD PRESSURE: 77 MMHG | HEART RATE: 59 BPM | TEMPERATURE: 97.5 F

## 2025-04-09 DIAGNOSIS — M47.817 LUMBOSACRAL SPONDYLOSIS WITHOUT MYELOPATHY: ICD-10-CM

## 2025-04-09 DIAGNOSIS — M48.062 SPINAL STENOSIS OF LUMBAR REGION WITH NEUROGENIC CLAUDICATION: Primary | ICD-10-CM

## 2025-04-09 DIAGNOSIS — R52 PAIN MANAGEMENT: ICD-10-CM

## 2025-04-09 PROCEDURE — 62323 NJX INTERLAMINAR LMBR/SAC: CPT | Performed by: ANESTHESIOLOGY

## 2025-04-09 PROCEDURE — 62323 NJX INTERLAMINAR LMBR/SAC: CPT

## 2025-04-09 PROCEDURE — 6360000002 HC RX W HCPCS: Performed by: ANESTHESIOLOGY

## 2025-04-09 PROCEDURE — 6360000004 HC RX CONTRAST MEDICATION: Performed by: ANESTHESIOLOGY

## 2025-04-09 RX ORDER — MIDAZOLAM HYDROCHLORIDE 2 MG/2ML
INJECTION, SOLUTION INTRAMUSCULAR; INTRAVENOUS
Status: COMPLETED | OUTPATIENT
Start: 2025-04-09 | End: 2025-04-09

## 2025-04-09 RX ORDER — LIDOCAINE HYDROCHLORIDE 10 MG/ML
INJECTION, SOLUTION EPIDURAL; INFILTRATION; INTRACAUDAL; PERINEURAL
Status: COMPLETED | OUTPATIENT
Start: 2025-04-09 | End: 2025-04-09

## 2025-04-09 RX ORDER — SODIUM CHLORIDE 0.9 % (FLUSH) 0.9 %
SYRINGE (ML) INJECTION
Status: COMPLETED | OUTPATIENT
Start: 2025-04-09 | End: 2025-04-09

## 2025-04-09 RX ORDER — DEXAMETHASONE SODIUM PHOSPHATE 10 MG/ML
INJECTION, SOLUTION INTRAMUSCULAR; INTRAVENOUS
Status: COMPLETED | OUTPATIENT
Start: 2025-04-09 | End: 2025-04-09

## 2025-04-09 RX ORDER — FENTANYL CITRATE 50 UG/ML
INJECTION, SOLUTION INTRAMUSCULAR; INTRAVENOUS
Status: COMPLETED | OUTPATIENT
Start: 2025-04-09 | End: 2025-04-09

## 2025-04-09 RX ADMIN — DEXAMETHASONE SODIUM PHOSPHATE 10 MG: 10 INJECTION INTRAMUSCULAR; INTRAVENOUS at 11:53

## 2025-04-09 RX ADMIN — Medication 5 ML: at 11:53

## 2025-04-09 RX ADMIN — IOHEXOL 3 ML: 180 INJECTION INTRAVENOUS at 11:52

## 2025-04-09 RX ADMIN — LIDOCAINE HYDROCHLORIDE 5 ML: 10 INJECTION, SOLUTION EPIDURAL; INFILTRATION; INTRACAUDAL; PERINEURAL at 11:51

## 2025-04-09 RX ADMIN — MIDAZOLAM HYDROCHLORIDE 1 MG: 1 INJECTION, SOLUTION INTRAMUSCULAR; INTRAVENOUS at 11:50

## 2025-04-09 RX ADMIN — FENTANYL CITRATE 50 MCG: 50 INJECTION, SOLUTION INTRAMUSCULAR; INTRAVENOUS at 11:50

## 2025-04-09 ASSESSMENT — PAIN DESCRIPTION - DESCRIPTORS: DESCRIPTORS: SHARP

## 2025-04-09 NOTE — DISCHARGE INSTRUCTIONS

## 2025-04-09 NOTE — OP NOTE
Patient Name: Melinda Baca   YOB: 1940  Room/Bed: Room/bed info not found  Medical Record Number: [id number]  Date: 4/9/2025       Sedation/ Anesthesia Plan:   intravenous sedation   as needed.    Medications Planned:   midazolam (Versed) / Fentanyl  Intravenously  as needed.    Preoperative Diagnosis:    1. Spinal stenosis of lumbar region with neurogenic claudication    2. Lumbosacral spondylosis without myelopathy        Postoperative Diagnosis:    1. Spinal stenosis of lumbar region with neurogenic claudication    2. Lumbosacral spondylosis without myelopathy        Procedure Performed:  Lumbar epidural steroid injection under fluoroscopy guidance    Blood Loss: None    Procedure:      The Patient was seen in the preop area, chart was reviewed, informed consent was obtained. Patient was taken to procedure room and was placed in prone position. Vital signs were monitored through out the  Procedure. A time out was completed.  The skin over the back was prepped and draped in sterile manner.     The target point was marked at The interlaminar space at L4/5 . Skin and deep tissues were anesthetized with 1 % lidocaine.A 20-gauge Tuohy epidural needlele was advanced  under fluoroscopy guidance in AP view. Epidural space was identified using DI technique. Position ws confirmed in Lateral view.   Then after negative aspiration contrast dye Omnipaque-180 was injected with live fluoroscopy in AP views that showed  spread of the contrast in the epidural space  and no vascular runoff or intrathecal spread. Finally 5 ml of treatment solution containing 4 ml of PF NS and 1 ml of DEXAMETHASONE 10 mg / ml was injected.  The needle was removed and a Band-Aid was placed over the needle  insertion site.  The patient's vital signs remained stable and the patient tolerated the procedure well.      Electronically signed by Kamran Ramirez MD on 4/9/2025 at 11:59 AM

## 2025-04-09 NOTE — H&P
Neurological:      Mental Status: alert.   Psychiatric:         Attention and Perception: Attention and perception normal.         Mood and Affect: Mood and affect normal.   Cardiovascular:      Rate: Normal rate.         ASA: 2          Mallampati: 2       Patient's current physical status, medications, medical history, and HPI have been reviewed and updated as appropriate on this date: 04/09/25    Risk/Benefit(s): The risks, benefits, alternatives, and potential complications have been discussed with the patient/family and informed consent has been obtained for the procedure/sedation.    Diagnosis:   1. Spinal stenosis of lumbar region with neurogenic claudication    2. Lumbosacral spondylosis without myelopathy            Plan:   LUMBAR EPIDURAL STEROID INJECTION        Kamran Ramirez MD

## 2025-05-27 ENCOUNTER — OFFICE VISIT (OUTPATIENT)
Dept: PAIN MANAGEMENT | Age: 85
End: 2025-05-27
Payer: MEDICARE

## 2025-05-27 VITALS — WEIGHT: 140 LBS | HEIGHT: 64 IN | BODY MASS INDEX: 23.9 KG/M2

## 2025-05-27 DIAGNOSIS — M47.817 LUMBOSACRAL SPONDYLOSIS WITHOUT MYELOPATHY: Primary | ICD-10-CM

## 2025-05-27 PROCEDURE — G8400 PT W/DXA NO RESULTS DOC: HCPCS | Performed by: ANESTHESIOLOGY

## 2025-05-27 PROCEDURE — 1159F MED LIST DOCD IN RCRD: CPT | Performed by: ANESTHESIOLOGY

## 2025-05-27 PROCEDURE — G8420 CALC BMI NORM PARAMETERS: HCPCS | Performed by: ANESTHESIOLOGY

## 2025-05-27 PROCEDURE — 1090F PRES/ABSN URINE INCON ASSESS: CPT | Performed by: ANESTHESIOLOGY

## 2025-05-27 PROCEDURE — 1123F ACP DISCUSS/DSCN MKR DOCD: CPT | Performed by: ANESTHESIOLOGY

## 2025-05-27 PROCEDURE — G8427 DOCREV CUR MEDS BY ELIG CLIN: HCPCS | Performed by: ANESTHESIOLOGY

## 2025-05-27 PROCEDURE — 99214 OFFICE O/P EST MOD 30 MIN: CPT | Performed by: ANESTHESIOLOGY

## 2025-05-27 PROCEDURE — 1036F TOBACCO NON-USER: CPT | Performed by: ANESTHESIOLOGY

## 2025-05-27 PROCEDURE — 1160F RVW MEDS BY RX/DR IN RCRD: CPT | Performed by: ANESTHESIOLOGY

## 2025-05-27 RX ORDER — GABAPENTIN 100 MG/1
200 CAPSULE ORAL 2 TIMES DAILY
Qty: 360 CAPSULE | Refills: 0 | Status: SHIPPED | OUTPATIENT
Start: 2025-05-27 | End: 2025-08-25

## 2025-05-27 ASSESSMENT — ENCOUNTER SYMPTOMS
EYES NEGATIVE: 1
BACK PAIN: 1
RESPIRATORY NEGATIVE: 1

## 2025-05-27 NOTE — PROGRESS NOTES
The patient is a 85 y.o. /  female.    Chief Complaint   Patient presents with    Back Pain     3 month f/u  Procedure f/u        Back Pain  Pertinent negatives include no fever, headaches, numbness or weakness.     Back pain  Index pain today is axial lower back pain going on for many years located in the lower lumbar area described as aching nagging stiffness  Worse in the morning get better by moving around and then worse against with excessive routine excessive activity  No dermatomal radiation of pain  No associated numbness or paresthesia  Patient have tried conservative measures with NSAID and physical therapy  Symptoms are progressively been worsening and affecting quality of life  Assessment history of moderate disability associated with pain  Previous imaging showed multilevel lower lumbar facet arthropathy  Clinical examination suggest facet mediated pain  Discussed diagnostic bilateral lumbar medial branch nerve block at L3-4 and L4-5 facet with fluoroscopy guidance  If this provide 80% plus relief and improve range of motion we will then consider for confirmatory block and radiofrequency ablation  Risk and benefit discussed with patient    In past 3 years ago she had 1 diagnostic block with excellent outcome but was not able to continue because of other medical reasons  Since it has been 3 years we will have to do diagnostic and confirmatory block also    No results found for: \"LABA1C\"       No results found for: \"LABA1C\"    Past Medical History, Past Surgical History, Social History, Allergies and Medications, reviewed and updated in EPIC as indicated     No results found for: \"LABA1C\"       Past Medical History:   Diagnosis Date    Acid reflux     Arthritis     History of blood transfusion     Hyperlipidemia     Pneumonia involving left lung 03/10/2020        Past Surgical History:   Procedure Laterality Date    CARDIAC CATHETERIZATION  1999    CATARACT REMOVAL      GALEN    HIP SURGERY

## 2025-07-02 ENCOUNTER — HOSPITAL ENCOUNTER (OUTPATIENT)
Dept: PAIN MANAGEMENT | Facility: CLINIC | Age: 85
Discharge: HOME | End: 2025-07-02
Payer: MEDICARE

## 2025-07-02 VITALS
BODY MASS INDEX: 24.98 KG/M2 | HEART RATE: 54 BPM | WEIGHT: 141 LBS | HEIGHT: 63 IN | TEMPERATURE: 98.3 F | OXYGEN SATURATION: 95 % | DIASTOLIC BLOOD PRESSURE: 70 MMHG | SYSTOLIC BLOOD PRESSURE: 134 MMHG | RESPIRATION RATE: 13 BRPM

## 2025-07-02 DIAGNOSIS — R52 PAIN MANAGEMENT: ICD-10-CM

## 2025-07-02 DIAGNOSIS — M47.817 LUMBOSACRAL SPONDYLOSIS WITHOUT MYELOPATHY: Primary | ICD-10-CM

## 2025-07-02 PROCEDURE — 64493 INJ PARAVERT F JNT L/S 1 LEV: CPT | Performed by: ANESTHESIOLOGY

## 2025-07-02 PROCEDURE — 6360000004 HC RX CONTRAST MEDICATION: Performed by: ANESTHESIOLOGY

## 2025-07-02 PROCEDURE — 6360000002 HC RX W HCPCS: Performed by: ANESTHESIOLOGY

## 2025-07-02 PROCEDURE — 64494 INJ PARAVERT F JNT L/S 2 LEV: CPT

## 2025-07-02 PROCEDURE — 99152 MOD SED SAME PHYS/QHP 5/>YRS: CPT | Performed by: ANESTHESIOLOGY

## 2025-07-02 PROCEDURE — 64493 INJ PARAVERT F JNT L/S 1 LEV: CPT

## 2025-07-02 PROCEDURE — 64494 INJ PARAVERT F JNT L/S 2 LEV: CPT | Performed by: ANESTHESIOLOGY

## 2025-07-02 RX ORDER — LIDOCAINE HYDROCHLORIDE 10 MG/ML
INJECTION, SOLUTION EPIDURAL; INFILTRATION; INTRACAUDAL; PERINEURAL
Status: COMPLETED | OUTPATIENT
Start: 2025-07-02 | End: 2025-07-02

## 2025-07-02 RX ORDER — BUPIVACAINE HYDROCHLORIDE 5 MG/ML
INJECTION, SOLUTION EPIDURAL; INTRACAUDAL; PERINEURAL
Status: COMPLETED | OUTPATIENT
Start: 2025-07-02 | End: 2025-07-02

## 2025-07-02 RX ORDER — MIDAZOLAM HYDROCHLORIDE 2 MG/2ML
INJECTION, SOLUTION INTRAMUSCULAR; INTRAVENOUS
Status: COMPLETED | OUTPATIENT
Start: 2025-07-02 | End: 2025-07-02

## 2025-07-02 RX ORDER — FENTANYL CITRATE 50 UG/ML
INJECTION, SOLUTION INTRAMUSCULAR; INTRAVENOUS
Status: COMPLETED | OUTPATIENT
Start: 2025-07-02 | End: 2025-07-02

## 2025-07-02 RX ADMIN — IOHEXOL 3 ML: 180 INJECTION INTRAVENOUS at 10:55

## 2025-07-02 RX ADMIN — BUPIVACAINE HYDROCHLORIDE 6 ML: 5 INJECTION, SOLUTION EPIDURAL; INTRACAUDAL; PERINEURAL at 10:57

## 2025-07-02 RX ADMIN — LIDOCAINE HYDROCHLORIDE 5 ML: 10 INJECTION, SOLUTION EPIDURAL; INFILTRATION; INTRACAUDAL; PERINEURAL at 10:53

## 2025-07-02 RX ADMIN — MIDAZOLAM HYDROCHLORIDE 1 MG: 1 INJECTION, SOLUTION INTRAMUSCULAR; INTRAVENOUS at 10:53

## 2025-07-02 RX ADMIN — FENTANYL CITRATE 50 MCG: 50 INJECTION, SOLUTION INTRAMUSCULAR; INTRAVENOUS at 10:53

## 2025-07-02 ASSESSMENT — PAIN - FUNCTIONAL ASSESSMENT
PAIN_FUNCTIONAL_ASSESSMENT: PREVENTS OR INTERFERES SOME ACTIVE ACTIVITIES AND ADLS
PAIN_FUNCTIONAL_ASSESSMENT: NONE - DENIES PAIN
PAIN_FUNCTIONAL_ASSESSMENT: 0-10

## 2025-07-02 ASSESSMENT — PAIN DESCRIPTION - DESCRIPTORS: DESCRIPTORS: DISCOMFORT

## 2025-07-02 NOTE — H&P
Pain Pre-Op H&P Note    Kamran Ramirez MD    HPI: Melinda Baca  presents with Back pain  Index pain today is axial lower back pain going on for many years located in the lower lumbar area described as aching nagging stiffness  Worse in the morning get better by moving around and then worse against with excessive routine excessive activity  No dermatomal radiation of pain  No associated numbness or paresthesia    Past Medical History:   Diagnosis Date    Acid reflux     Arthritis     History of blood transfusion     Hyperlipidemia     Pneumonia involving left lung 03/10/2020       Past Surgical History:   Procedure Laterality Date    CARDIAC CATHETERIZATION  1999    CATARACT REMOVAL      GALEN    HIP SURGERY      galen GOPAL    JOINT REPLACEMENT      LUMBAR SPINE SURGERY Bilateral 08/24/2022    Percutaneous image guided lumbar decompression using MILD device from [employer] AT L3 / 4 AND L4-L5 performed by Kamran Ramirez MD at [facility] OR    PAIN MANAGEMENT PROCEDURE         History reviewed. No pertinent family history.    Allergies   Allergen Reactions    Aspirin Other (See Comments)    Morphine Nausea Only and Other (See Comments)         Current Outpatient Medications:     gabapentin (NEURONTIN) 100 MG capsule, Take 2 capsules by mouth 2 times daily for 90 days. Intended supply: 30 days, Disp: 360 capsule, Rfl: 0    gabapentin (NEURONTIN) 100 MG capsule, Take 2 capsules by mouth in the morning and 2 capsules in the evening. Do all this for 90 days. Intended supply: 30 days., Disp: 360 capsule, Rfl: 0    topiramate (TOPAMAX) 25 MG tablet, Take 1 tablet by mouth daily, Disp: , Rfl:     pantoprazole (PROTONIX) 40 MG tablet, Take 1 tablet by mouth daily, Disp: , Rfl:     acetaminophen (TYLENOL) 500 MG tablet, Take 1 tablet by mouth every 6 hours as needed, Disp: , Rfl:     fluticasone (FLONASE) 50 MCG/ACT nasal spray, SPRAY 2 SPRAYS INTO EACH NOSTRIL EVERY DAY, Disp: , Rfl:     albuterol sulfate HFA (PROVENTIL;VENTOLIN;PROAIR)

## 2025-07-02 NOTE — OP NOTE
Patient Name: Melinda Baca   YOB: 1940  Room/Bed: Room/bed info not found  Medical Record Number: [id number]  Date: 7/2/2025       Sedation/ Anesthesia Plan:   intravenous sedation   as needed.    Medications Planned:   midazolam (Versed) / Fentanyl  Intravenously  as needed.    Preoperative Diagnosis: Lumbar spondylosis w/o myelopathy or radiculopathy  Postoperative Diagnosis: Lumbar spondylosis w/o myelopathy or radiculopathy  Blood Loss: none    Procedure Performed:  Bilateral Lumbar Medial Branch nerve Blocks at the transverse processes of L3, L4, L5 under fluoroscopy guidance    Procedure:      The Patient was seen in the preop area, chart was reviewed, informed consent was obtained. Patient was taken to procedure room and was placed in prone position. Vital signs were monitored through out the  Procedure. A time out was completed.  The skin over the back was prepped and draped in sterile manner.     The target point was marked at the junction of Transverse process and superior articular process at the target levels.  Skin and deep tissues were anesthetized with 1 % lidocaine. A 25-gauge needlele was advanced to the target spots under fluoroscopy guidance in AP / Lateral and Oblique views.     Then after negative aspiration contrast dye was injected with live fluoroscopy in AP views that showed  spread of the contrast with no epidural space and no vascular runoff or intrathecal spread.    Finally 0.5 ml of treatment solution 0.5% BUPIVACAINE  was injected at each level.  The needle was removed and a Band-Aid was placed over the needle  insertion site.  The patient's vital signs remained stable and the patient tolerated the procedure well.      Electronically signed by Kamran Ramirez MD on 7/2/2025 at 10:59 AM    SEDATION NOTE:    ASA CLASSIFICATION  2  MP   CLASSIFICATION  2    Moderate intravenous conscious sedation was supervised by Dr. Ramirez  The patient was independently monitored by a

## 2025-07-02 NOTE — DISCHARGE INSTRUCTIONS
Discharge Instructions following Sedation or Anesthesia:  You have  received  a sedative/anesthetic therefore, you should not consume any alcoholic beverages for minimum of 12 hours.  Do not drive or operate machinery for 24 hours.  Do not sign legal documents for 24 hours.  Dizziness, drowsiness, and unsteadiness may occur.  Rest when need to.  Increase diet as tolerated.  Keep up on fluids if diet allows.      General Instructions:  Do not take a tub bath for 72 hours after procedure (this includes hot tubs and swimming pools).  You may shower, but avoid hot water to injection site.   Avoid strenuous activity TODAY especially if you experience dizziness.   Remove band-aid the next day.  Wash off any residual iodine   Do not use heat, heating pad, or any other heating device over the injection site for 3 days after the procedure.  If you experience pain after your procedure, you may continue with your current pain medication as prescribed.  (DO NOT INCREASE YOUR PAIN MEDICATION WITHOUT TALKING TO DOCTOR)  Soreness and pain at injection site is common, may use ice to reduce soreness.    Please complete pain diary as instructed.     Call [facility] at [phone] if you experience:   Fever, chills or temperature over 100    Vomiting, Headache, persistent stiff neck, nausea, blurred vision   Difficulty in urinating or unable to urinate with 8 hours   Increase in weakness, numbness or loss of function   Increased redness, swelling or drainage at the injection site

## 2025-07-03 ENCOUNTER — TELEPHONE (OUTPATIENT)
Dept: PAIN MANAGEMENT | Age: 85
End: 2025-07-03

## 2025-07-03 NOTE — TELEPHONE ENCOUNTER
Procedure: Bilateral Lumbar Medial Branch nerve Blocks at the transverse processes of L3, L4, L5 under fluoroscopy guidance     DOS: 7/2/25    Pain level before procedure with activity: 7/10    Pain with activity after procedure: 4/10    What activities done the day of procedure: Nothing. Ate lunch and went to bed. Watered plants around 8:30 pm     What percentage of  pain relief from procedure did you receive: Pt unsure- went to bed.     Success: No    OV Scheduled  7/29/25

## 2025-07-29 ENCOUNTER — OFFICE VISIT (OUTPATIENT)
Dept: PAIN MANAGEMENT | Age: 85
End: 2025-07-29
Payer: MEDICARE

## 2025-07-29 VITALS — BODY MASS INDEX: 24.98 KG/M2 | WEIGHT: 141 LBS | HEIGHT: 63 IN

## 2025-07-29 DIAGNOSIS — M54.16 LUMBAR RADICULOPATHY: ICD-10-CM

## 2025-07-29 DIAGNOSIS — Z96.643 HISTORY OF BILATERAL TOTAL HIP ARTHROPLASTY: ICD-10-CM

## 2025-07-29 DIAGNOSIS — M47.817 LUMBOSACRAL SPONDYLOSIS WITHOUT MYELOPATHY: Primary | ICD-10-CM

## 2025-07-29 PROCEDURE — 1090F PRES/ABSN URINE INCON ASSESS: CPT | Performed by: ANESTHESIOLOGY

## 2025-07-29 PROCEDURE — 1036F TOBACCO NON-USER: CPT | Performed by: ANESTHESIOLOGY

## 2025-07-29 PROCEDURE — 1123F ACP DISCUSS/DSCN MKR DOCD: CPT | Performed by: ANESTHESIOLOGY

## 2025-07-29 PROCEDURE — G8427 DOCREV CUR MEDS BY ELIG CLIN: HCPCS | Performed by: ANESTHESIOLOGY

## 2025-07-29 PROCEDURE — G8400 PT W/DXA NO RESULTS DOC: HCPCS | Performed by: ANESTHESIOLOGY

## 2025-07-29 PROCEDURE — 1160F RVW MEDS BY RX/DR IN RCRD: CPT | Performed by: ANESTHESIOLOGY

## 2025-07-29 PROCEDURE — 1159F MED LIST DOCD IN RCRD: CPT | Performed by: ANESTHESIOLOGY

## 2025-07-29 PROCEDURE — 99214 OFFICE O/P EST MOD 30 MIN: CPT | Performed by: ANESTHESIOLOGY

## 2025-07-29 PROCEDURE — G8420 CALC BMI NORM PARAMETERS: HCPCS | Performed by: ANESTHESIOLOGY

## 2025-07-29 ASSESSMENT — ENCOUNTER SYMPTOMS
BACK PAIN: 1
RESPIRATORY NEGATIVE: 1
EYES NEGATIVE: 1

## 2025-07-29 NOTE — PROGRESS NOTES
The patient is a 85 y.o. /  female.    Chief Complaint   Patient presents with    Back Pain     Failed MBB        Melinda is a pleasant 85-year-old female with history of chronic axial residual lower back pain  Following up today after recent lumbar diagnostic medial branch nerve block at L4-5 and L5-S1 facet  Report 80% or more improvement in axial back pain for several hours after the procedure with improved elevation and activity tolerance pain returned back to the baseline affecting quality of life  Discussed next step is a confirmatory nerve block at the same levels with fluoroscopy guidance  If this also provide good relief we will then consider for radiofrequency ablation  Patient agreed with the treatment plan    She is also complaining of worsening numbness and tingling involving both feet send glove and stocking distribution  No previous diabetic history  No previous EMG nerve testing  Will obtain EMG to confirm peripheral neuropathy  May consider blood workup to rule out any identifiable correctable cause for peripheral neuropathy  May consider neurology referral in future      S/P:PIERO, 07/02/2025      Outcome   Any improvement of activity?  Yes   Any side effects (appetite,leg cramping,facial fleshing):no   Increase of pain:  No  Pain score Today:  8-9  % of pain relief: 80%  Pain diary (medial branch block): Yes    No results found for: \"LABA1C\"       Past Medical History:   Diagnosis Date    Acid reflux     Arthritis     History of blood transfusion     Hyperlipidemia     Pneumonia involving left lung 03/10/2020        Past Surgical History:   Procedure Laterality Date    CARDIAC CATHETERIZATION  1999    CATARACT REMOVAL      GAL[facility]    HIP SURGERY      galen GOPAL    JOINT REPLACEMENT      LUMBAR SPINE SURGERY Bilateral 08/24/2022    Percutaneous image guided lumbar decompression using MILD device from [employer] AT L3 / 4 AND L4-L5 performed by Kamran Ramirez MD at [facility] OR    PAIN MANAGEMENT

## 2025-08-26 ENCOUNTER — OFFICE VISIT (OUTPATIENT)
Dept: PAIN MANAGEMENT | Age: 85
End: 2025-08-26
Payer: MEDICARE

## 2025-08-26 VITALS — HEIGHT: 63 IN | BODY MASS INDEX: 24.98 KG/M2 | WEIGHT: 141 LBS

## 2025-08-26 DIAGNOSIS — M54.16 LUMBAR RADICULOPATHY: ICD-10-CM

## 2025-08-26 DIAGNOSIS — Z79.899 MEDICATION MANAGEMENT: ICD-10-CM

## 2025-08-26 DIAGNOSIS — M47.817 LUMBOSACRAL SPONDYLOSIS WITHOUT MYELOPATHY: Primary | ICD-10-CM

## 2025-08-26 PROCEDURE — G8400 PT W/DXA NO RESULTS DOC: HCPCS | Performed by: ANESTHESIOLOGY

## 2025-08-26 PROCEDURE — G8420 CALC BMI NORM PARAMETERS: HCPCS | Performed by: ANESTHESIOLOGY

## 2025-08-26 PROCEDURE — 1090F PRES/ABSN URINE INCON ASSESS: CPT | Performed by: ANESTHESIOLOGY

## 2025-08-26 PROCEDURE — G8427 DOCREV CUR MEDS BY ELIG CLIN: HCPCS | Performed by: ANESTHESIOLOGY

## 2025-08-26 PROCEDURE — 1160F RVW MEDS BY RX/DR IN RCRD: CPT | Performed by: ANESTHESIOLOGY

## 2025-08-26 PROCEDURE — 1159F MED LIST DOCD IN RCRD: CPT | Performed by: ANESTHESIOLOGY

## 2025-08-26 PROCEDURE — 99214 OFFICE O/P EST MOD 30 MIN: CPT | Performed by: ANESTHESIOLOGY

## 2025-08-26 PROCEDURE — 1123F ACP DISCUSS/DSCN MKR DOCD: CPT | Performed by: ANESTHESIOLOGY

## 2025-08-26 PROCEDURE — 1125F AMNT PAIN NOTED PAIN PRSNT: CPT | Performed by: ANESTHESIOLOGY

## 2025-08-26 PROCEDURE — 1036F TOBACCO NON-USER: CPT | Performed by: ANESTHESIOLOGY

## 2025-08-26 RX ORDER — GABAPENTIN 100 MG/1
200 CAPSULE ORAL 2 TIMES DAILY
Qty: 360 CAPSULE | Refills: 0 | Status: SHIPPED | OUTPATIENT
Start: 2025-08-26 | End: 2025-11-24

## 2025-08-26 ASSESSMENT — ENCOUNTER SYMPTOMS
GASTROINTESTINAL NEGATIVE: 1
RESPIRATORY NEGATIVE: 1
BACK PAIN: 1

## (undated) DEVICE — GLOVE SURG SZ 8 THK118MIL BLK LTX FREE POLYISOPRENE BEAD CUF

## (undated) DEVICE — C-ARM: Brand: UNBRANDED

## (undated) DEVICE — Device: Brand: MILD DEVICE KIT

## (undated) DEVICE — NEEDLE EPI 20GA L6IN TUOHY CRV BLNT CUT TIP WNG LUERLOCK

## (undated) DEVICE — Device

## (undated) DEVICE — 1010 S-DRAPE TOWEL DRAPE 10/BX: Brand: STERI-DRAPE™

## (undated) DEVICE — INTENDED FOR TISSUE SEPARATION, AND OTHER PROCEDURES THAT REQUIRE A SHARP SURGICAL BLADE TO PUNCTURE OR CUT.: Brand: BARD-PARKER ® CARBON RIB-BACK BLADES

## (undated) DEVICE — ADHESIVE SKIN CLOSURE TOP 36 CC HI VISC DERMBND MINI

## (undated) DEVICE — NEEDLE SPINAL 22GA L3.5IN SPINOCAN

## (undated) DEVICE — MINOR BSIN PK

## (undated) DEVICE — SHEET, T, LAPAROTOMY, STERILE: Brand: MEDLINE

## (undated) DEVICE — APPLICATOR MEDICATED 26 CC SOLUTION HI LT ORNG CHLORAPREP